# Patient Record
Sex: FEMALE | Race: WHITE | NOT HISPANIC OR LATINO | Employment: OTHER | URBAN - METROPOLITAN AREA
[De-identification: names, ages, dates, MRNs, and addresses within clinical notes are randomized per-mention and may not be internally consistent; named-entity substitution may affect disease eponyms.]

---

## 2017-04-05 ENCOUNTER — OFFICE VISIT (OUTPATIENT)
Dept: ORTHOPEDIC SURGERY | Facility: CLINIC | Age: 67
End: 2017-04-05

## 2017-04-05 VITALS — TEMPERATURE: 98 F | WEIGHT: 145 LBS | BODY MASS INDEX: 21.98 KG/M2 | HEIGHT: 68 IN

## 2017-04-05 DIAGNOSIS — M70.70 BURSITIS, HIP, UNSPECIFIED LATERALITY: ICD-10-CM

## 2017-04-05 DIAGNOSIS — G89.29 HIP PAIN, CHRONIC, RIGHT: Primary | ICD-10-CM

## 2017-04-05 DIAGNOSIS — M25.551 HIP PAIN, CHRONIC, RIGHT: Primary | ICD-10-CM

## 2017-04-05 PROCEDURE — 73502 X-RAY EXAM HIP UNI 2-3 VIEWS: CPT | Performed by: ORTHOPAEDIC SURGERY

## 2017-04-05 PROCEDURE — 20610 DRAIN/INJ JOINT/BURSA W/O US: CPT | Performed by: ORTHOPAEDIC SURGERY

## 2017-04-05 PROCEDURE — 99213 OFFICE O/P EST LOW 20 MIN: CPT | Performed by: ORTHOPAEDIC SURGERY

## 2017-04-05 RX ORDER — METHYLPREDNISOLONE ACETATE 80 MG/ML
80 INJECTION, SUSPENSION INTRA-ARTICULAR; INTRALESIONAL; INTRAMUSCULAR; SOFT TISSUE
Status: COMPLETED | OUTPATIENT
Start: 2017-04-05 | End: 2017-04-05

## 2017-04-05 RX ORDER — LIDOCAINE HYDROCHLORIDE 10 MG/ML
2 INJECTION, SOLUTION INFILTRATION; PERINEURAL
Status: COMPLETED | OUTPATIENT
Start: 2017-04-05 | End: 2017-04-05

## 2017-04-05 RX ADMIN — METHYLPREDNISOLONE ACETATE 80 MG: 80 INJECTION, SUSPENSION INTRA-ARTICULAR; INTRALESIONAL; INTRAMUSCULAR; SOFT TISSUE at 14:19

## 2017-04-05 RX ADMIN — LIDOCAINE HYDROCHLORIDE 2 ML: 10 INJECTION, SOLUTION INFILTRATION; PERINEURAL at 14:19

## 2017-04-05 NOTE — PROGRESS NOTES
Patient: Agnieszka Nettles  YOB: 1950 67 y.o. female  Medical Record Number: [id number]    Chief Complaint:   Chief Complaint   Patient presents with   • Right Hip - Pain, Follow-up       History of Present Illness:Agnieszka Nettles is a 67 y.o. female who presents for follow-up of  right hip.  His been a couple years since she was last here she had a diagnosis of trochanteric bursitis and had an injection.  She feels that the Raffaele helped temporarily but the pain has returned and she is complaining of pain about the lateral aspect of her hip.  She describes as a constant ache worse when she is laying on her side or getting started from a seated position.    Allergies: No Known Allergies    Medications:   Current Outpatient Prescriptions   Medication Sig Dispense Refill   • cholecalciferol (VITAMIN D3) 1000 UNITS tablet Take 1,000 Units by mouth 2 (Two) Times a Day.     • docusate sodium (COLACE) 100 MG capsule Take 100 mg by mouth 2 (Two) Times a Day.     • gabapentin (NEURONTIN) 300 MG capsule TAKE 1 CAPSULE BY MOUTH THREE TIMES DAILY 272 capsule 3   • Iron-Vitamins (GERITOL COMPLETE PO) Take  by mouth Daily.     • levothyroxine (SYNTHROID, LEVOTHROID) 75 MCG tablet Take 75 mcg by mouth Daily.     • meloxicam (MOBIC) 7.5 MG tablet Take 1 tablet by mouth Daily. 35 tablet 3   • ranitidine (ZANTAC) 150 MG tablet TK 1 T PO  BID  5   • vitamin B-12 (CYANOCOBALAMIN) 1000 MCG tablet Take 1,000 mcg by mouth 1 (One) Time Per Week.       No current facility-administered medications for this visit.          The following portions of the patient's history were reviewed and updated as appropriate: allergies, current medications, past family history, past medical history, past social history, past surgical history and problem list.    Review of Systems:   A 14 point review of systems was performed. All systems negative except pertinent positives/negative listed in HPI above    Physical Exam:   Vitals:    04/05/17  "1301   Temp: 98 °F (36.7 °C)   Weight: 145 lb (65.8 kg)   Height: 68\" (172.7 cm)       General: A and O x 3, ASA, NAD    SCLERA:    Normal    DENTITION:   Normal  Hip:  right    LEG ALIGNMENT:     Neutral   ,    equal leg lengths    GAIT:     Nonantalgic    SKIN:     No abnormality    RANGE OF MOTION:      Full without joint irritability    STRENGTH:     5 / 5    hip flexion and abduction    DISTAL PULSES:    Paplable    DISTAL SENSATION :   Intact    LYMPHATICS:     No   lymphadenopathy    OTHER:          - Negative Stinchfeld test      - Negative log roll      +Tenderness to palpation trochanteric bursa     Radiology:    Xrays 2views (AP bilateral hips and lateral hip) were ordered and reviewed for evaluation of hip pain demonstrating no abnormality  Comparison views: todays xrays were compared to previous xrays and demonstrate no change    Assessment/Plan:   right greater than left hip trochanteric bursitis.  I injected the right hip bursa as below.  I spent about 15 minutes going over hip fascia stretching exercises and I will have her perform those exercises 3 minutes twice a day.  I'll see her back on a when necessary basis.  Large Joint Arthrocentesis  Date/Time: 4/5/2017 2:19 PM  Consent given by: patient  Site marked: site marked  Timeout: Immediately prior to procedure a time out was called to verify the correct patient, procedure, equipment, support staff and site/side marked as required   Supporting Documentation  Indications: pain   Procedure Details  Location: hip - R greater trochanteric bursa  Preparation: Patient was prepped and draped in the usual sterile fashion  Needle size: 18 G  Approach: lateral  Medications administered: 2 mL lidocaine 1 %; 80 mg methylPREDNISolone acetate 80 MG/ML              Ahsan Villalobos MD  4/5/2017  "

## 2017-06-08 ENCOUNTER — HOSPITAL ENCOUNTER (OUTPATIENT)
Dept: OTHER | Facility: HOSPITAL | Age: 67
Discharge: HOME | End: 2017-06-08

## 2017-06-08 ENCOUNTER — APPOINTMENT (OUTPATIENT)
Dept: WOMENS IMAGING | Facility: HOSPITAL | Age: 67
End: 2017-06-08

## 2017-06-08 PROCEDURE — 77063 BREAST TOMOSYNTHESIS BI: CPT | Performed by: RADIOLOGY

## 2017-06-08 PROCEDURE — MDREVIEWSP: Performed by: RADIOLOGY

## 2017-06-08 PROCEDURE — G0202 SCR MAMMO BI INCL CAD: HCPCS | Performed by: RADIOLOGY

## 2018-06-26 ENCOUNTER — OFFICE VISIT CONVERTED (OUTPATIENT)
Dept: SURGERY | Facility: CLINIC | Age: 68
End: 2018-06-26
Attending: NURSE PRACTITIONER

## 2018-07-23 ENCOUNTER — HOSPITAL ENCOUNTER (OUTPATIENT)
Dept: OTHER | Facility: HOSPITAL | Age: 68
Discharge: HOME | End: 2018-07-23

## 2018-07-23 ENCOUNTER — APPOINTMENT (OUTPATIENT)
Dept: WOMENS IMAGING | Facility: HOSPITAL | Age: 68
End: 2018-07-23

## 2018-07-23 PROCEDURE — MDREVIEWSP: Performed by: RADIOLOGY

## 2018-07-23 PROCEDURE — 77067 SCR MAMMO BI INCL CAD: CPT | Performed by: RADIOLOGY

## 2018-07-23 PROCEDURE — 77063 BREAST TOMOSYNTHESIS BI: CPT | Performed by: RADIOLOGY

## 2019-03-28 ENCOUNTER — HOSPITAL ENCOUNTER (OUTPATIENT)
Dept: SLEEP MEDICINE | Facility: HOSPITAL | Age: 69
Discharge: HOME | End: 2019-03-28
Attending: INTERNAL MEDICINE

## 2019-07-29 ENCOUNTER — HOSPITAL ENCOUNTER (OUTPATIENT)
Dept: LAB | Facility: HOSPITAL | Age: 69
Discharge: HOME | End: 2019-07-29
Attending: INTERNAL MEDICINE

## 2019-07-29 LAB
ALBUMIN SERPL-MCNC: 4.4 G/DL (ref 3.5–5)
ALBUMIN/GLOB SERPL: 1.5 {RATIO} (ref 1.4–2.6)
ALP SERPL-CCNC: 66 U/L (ref 43–160)
ALT SERPL-CCNC: 18 U/L (ref 10–40)
ANION GAP SERPL CALC-SCNC: 16 MMOL/L (ref 8–19)
AST SERPL-CCNC: 22 U/L (ref 15–50)
BASOPHILS # BLD AUTO: 0.04 10*3/UL (ref 0–0.2)
BASOPHILS NFR BLD AUTO: 0.9 % (ref 0–3)
BILIRUB SERPL-MCNC: 0.67 MG/DL (ref 0.2–1.3)
BUN SERPL-MCNC: 12 MG/DL (ref 5–25)
BUN/CREAT SERPL: 16 {RATIO} (ref 6–20)
CALCIUM SERPL-MCNC: 9.9 MG/DL (ref 8.7–10.4)
CHLORIDE SERPL-SCNC: 103 MMOL/L (ref 99–111)
CONV ABS IMM GRAN: 0.01 10*3/UL (ref 0–0.2)
CONV CO2: 29 MMOL/L (ref 22–32)
CONV IMMATURE GRAN: 0.2 % (ref 0–1.8)
CONV TOTAL PROTEIN: 7.3 G/DL (ref 6.3–8.2)
CREAT UR-MCNC: 0.76 MG/DL (ref 0.5–0.9)
DEPRECATED RDW RBC AUTO: 44.1 FL (ref 36.4–46.3)
EOSINOPHIL # BLD AUTO: 0.07 10*3/UL (ref 0–0.7)
EOSINOPHIL # BLD AUTO: 1.6 % (ref 0–7)
ERYTHROCYTE [DISTWIDTH] IN BLOOD BY AUTOMATED COUNT: 13 % (ref 11.7–14.4)
GFR SERPLBLD BASED ON 1.73 SQ M-ARVRAT: >60 ML/MIN/{1.73_M2}
GLOBULIN UR ELPH-MCNC: 2.9 G/DL (ref 2–3.5)
GLUCOSE SERPL-MCNC: 92 MG/DL (ref 65–99)
HBA1C MFR BLD: 12.5 G/DL (ref 12–16)
HCT VFR BLD AUTO: 39.3 % (ref 37–47)
LYMPHOCYTES # BLD AUTO: 1.6 10*3/UL (ref 1–5)
MCH RBC QN AUTO: 29.4 PG (ref 27–31)
MCHC RBC AUTO-ENTMCNC: 31.8 G/DL (ref 33–37)
MCV RBC AUTO: 92.5 FL (ref 81–99)
MONOCYTES # BLD AUTO: 0.41 10*3/UL (ref 0.2–1.2)
MONOCYTES NFR BLD AUTO: 9.6 % (ref 3–10)
NEUTROPHILS # BLD AUTO: 2.12 10*3/UL (ref 2–8)
NEUTROPHILS NFR BLD AUTO: 50.1 % (ref 30–85)
NRBC CBCN: 0 % (ref 0–0.7)
OSMOLALITY SERPL CALC.SUM OF ELEC: 297 MOSM/KG (ref 273–304)
PLATELET # BLD AUTO: 190 10*3/UL (ref 130–400)
PMV BLD AUTO: 11.3 FL (ref 9.4–12.3)
POTASSIUM SERPL-SCNC: 4 MMOL/L (ref 3.5–5.3)
RBC # BLD AUTO: 4.25 10*6/UL (ref 4.2–5.4)
SODIUM SERPL-SCNC: 144 MMOL/L (ref 135–147)
T4 FREE SERPL-MCNC: 1.3 NG/DL (ref 0.9–1.8)
TSH SERPL-ACNC: 2.64 M[IU]/L (ref 0.27–4.2)
VARIANT LYMPHS NFR BLD MANUAL: 37.6 % (ref 20–45)
WBC # BLD AUTO: 4.25 10*3/UL (ref 4.8–10.8)

## 2019-08-19 ENCOUNTER — APPOINTMENT (OUTPATIENT)
Dept: WOMENS IMAGING | Facility: HOSPITAL | Age: 69
End: 2019-08-19

## 2019-08-19 ENCOUNTER — HOSPITAL ENCOUNTER (OUTPATIENT)
Dept: OTHER | Facility: HOSPITAL | Age: 69
Discharge: HOME | End: 2019-08-19

## 2019-08-19 PROCEDURE — 77067 SCR MAMMO BI INCL CAD: CPT | Performed by: RADIOLOGY

## 2019-08-19 PROCEDURE — 77063 BREAST TOMOSYNTHESIS BI: CPT | Performed by: RADIOLOGY

## 2019-08-19 PROCEDURE — MDREVIEWSP: Performed by: RADIOLOGY

## 2020-07-27 ENCOUNTER — HOSPITAL ENCOUNTER (OUTPATIENT)
Dept: LAB | Facility: HOSPITAL | Age: 70
Discharge: HOME | End: 2020-07-27
Attending: INTERNAL MEDICINE

## 2020-07-27 LAB
ALBUMIN SERPL-MCNC: 4.1 G/DL (ref 3.5–5)
ALBUMIN/GLOB SERPL: 1.6 {RATIO} (ref 1.4–2.6)
ALP SERPL-CCNC: 41 U/L (ref 43–160)
ALT SERPL-CCNC: 16 U/L (ref 10–40)
ANION GAP SERPL CALC-SCNC: 14 MMOL/L (ref 8–19)
AST SERPL-CCNC: 20 U/L (ref 15–50)
BASOPHILS # BLD AUTO: 0.04 10*3/UL (ref 0–0.2)
BASOPHILS NFR BLD AUTO: 1 % (ref 0–3)
BILIRUB SERPL-MCNC: 0.58 MG/DL (ref 0.2–1.3)
BUN SERPL-MCNC: 12 MG/DL (ref 5–25)
BUN/CREAT SERPL: 15 {RATIO} (ref 6–20)
CALCIUM SERPL-MCNC: 9.7 MG/DL (ref 8.7–10.4)
CHLORIDE SERPL-SCNC: 101 MMOL/L (ref 99–111)
CHOLEST SERPL-MCNC: 178 MG/DL (ref 107–200)
CHOLEST/HDLC SERPL: 2.1 {RATIO} (ref 3–6)
CONV ABS IMM GRAN: 0.01 10*3/UL (ref 0–0.2)
CONV CO2: 28 MMOL/L (ref 22–32)
CONV IMMATURE GRAN: 0.2 % (ref 0–1.8)
CONV TOTAL PROTEIN: 6.7 G/DL (ref 6.3–8.2)
CREAT UR-MCNC: 0.78 MG/DL (ref 0.5–0.9)
DEPRECATED RDW RBC AUTO: 44.2 FL (ref 36.4–46.3)
EOSINOPHIL # BLD AUTO: 0.08 10*3/UL (ref 0–0.7)
EOSINOPHIL # BLD AUTO: 1.9 % (ref 0–7)
ERYTHROCYTE [DISTWIDTH] IN BLOOD BY AUTOMATED COUNT: 13 % (ref 11.7–14.4)
GFR SERPLBLD BASED ON 1.73 SQ M-ARVRAT: >60 ML/MIN/{1.73_M2}
GLOBULIN UR ELPH-MCNC: 2.6 G/DL (ref 2–3.5)
GLUCOSE SERPL-MCNC: 85 MG/DL (ref 65–99)
HCT VFR BLD AUTO: 38.9 % (ref 37–47)
HDLC SERPL-MCNC: 85 MG/DL (ref 40–60)
HGB BLD-MCNC: 12.5 G/DL (ref 12–16)
LDLC SERPL CALC-MCNC: 80 MG/DL (ref 70–100)
LYMPHOCYTES # BLD AUTO: 1.57 10*3/UL (ref 1–5)
LYMPHOCYTES NFR BLD AUTO: 37.9 % (ref 20–45)
MCH RBC QN AUTO: 29.8 PG (ref 27–31)
MCHC RBC AUTO-ENTMCNC: 32.1 G/DL (ref 33–37)
MCV RBC AUTO: 92.8 FL (ref 81–99)
MONOCYTES # BLD AUTO: 0.39 10*3/UL (ref 0.2–1.2)
MONOCYTES NFR BLD AUTO: 9.4 % (ref 3–10)
NEUTROPHILS # BLD AUTO: 2.05 10*3/UL (ref 2–8)
NEUTROPHILS NFR BLD AUTO: 49.6 % (ref 30–85)
NRBC CBCN: 0 % (ref 0–0.7)
OSMOLALITY SERPL CALC.SUM OF ELEC: 287 MOSM/KG (ref 273–304)
PLATELET # BLD AUTO: 173 10*3/UL (ref 130–400)
PMV BLD AUTO: 11 FL (ref 9.4–12.3)
POTASSIUM SERPL-SCNC: 4 MMOL/L (ref 3.5–5.3)
RBC # BLD AUTO: 4.19 10*6/UL (ref 4.2–5.4)
SODIUM SERPL-SCNC: 139 MMOL/L (ref 135–147)
T4 FREE SERPL-MCNC: 1.3 NG/DL (ref 0.9–1.8)
TRIGL SERPL-MCNC: 64 MG/DL (ref 40–150)
TSH SERPL-ACNC: 3.33 M[IU]/L (ref 0.27–4.2)
VLDLC SERPL-MCNC: 13 MG/DL (ref 5–37)
WBC # BLD AUTO: 4.14 10*3/UL (ref 4.8–10.8)

## 2020-08-21 ENCOUNTER — APPOINTMENT (OUTPATIENT)
Dept: WOMENS IMAGING | Facility: HOSPITAL | Age: 70
End: 2020-08-21

## 2020-08-21 ENCOUNTER — HOSPITAL ENCOUNTER (OUTPATIENT)
Dept: OTHER | Facility: HOSPITAL | Age: 70
Discharge: HOME | End: 2020-08-21

## 2020-08-21 PROCEDURE — 77063 BREAST TOMOSYNTHESIS BI: CPT | Performed by: RADIOLOGY

## 2020-08-21 PROCEDURE — 77067 SCR MAMMO BI INCL CAD: CPT | Performed by: RADIOLOGY

## 2020-08-27 ENCOUNTER — HOSPITAL ENCOUNTER (OUTPATIENT)
Dept: SLEEP MEDICINE | Facility: HOSPITAL | Age: 70
Discharge: HOME | End: 2020-08-27
Attending: INTERNAL MEDICINE

## 2020-09-09 ENCOUNTER — HOSPITAL ENCOUNTER (OUTPATIENT)
Dept: GENERAL RADIOLOGY | Facility: HOSPITAL | Age: 70
Discharge: HOME | End: 2020-09-09
Attending: INTERNAL MEDICINE

## 2020-09-09 LAB
CREAT BLD-MCNC: 0.7 MG/DL (ref 0.6–1.4)
GFR SERPLBLD BASED ON 1.73 SQ M-ARVRAT: >60 ML/MIN/{1.73_M2}

## 2020-09-25 ENCOUNTER — APPOINTMENT (OUTPATIENT)
Dept: WOMENS IMAGING | Facility: HOSPITAL | Age: 70
End: 2020-09-25

## 2020-09-25 ENCOUNTER — HOSPITAL ENCOUNTER (OUTPATIENT)
Dept: OTHER | Facility: HOSPITAL | Age: 70
Discharge: HOME | End: 2020-09-25

## 2020-09-25 PROCEDURE — 77065 DX MAMMO INCL CAD UNI: CPT | Performed by: RADIOLOGY

## 2020-09-25 PROCEDURE — G0279 TOMOSYNTHESIS, MAMMO: HCPCS | Performed by: RADIOLOGY

## 2020-09-25 PROCEDURE — 76641 ULTRASOUND BREAST COMPLETE: CPT | Performed by: RADIOLOGY

## 2020-12-15 ENCOUNTER — HOSPITAL ENCOUNTER (OUTPATIENT)
Dept: GENERAL RADIOLOGY | Facility: HOSPITAL | Age: 70
Discharge: HOME | End: 2020-12-15
Attending: INTERNAL MEDICINE

## 2021-01-14 ENCOUNTER — OFFICE VISIT (OUTPATIENT)
Dept: OTHER | Facility: HOSPITAL | Age: 71
End: 2021-01-14

## 2021-01-14 VITALS
DIASTOLIC BLOOD PRESSURE: 62 MMHG | WEIGHT: 158 LBS | RESPIRATION RATE: 16 BRPM | HEART RATE: 66 BPM | BODY MASS INDEX: 24.02 KG/M2 | OXYGEN SATURATION: 98 % | SYSTOLIC BLOOD PRESSURE: 100 MMHG

## 2021-01-14 DIAGNOSIS — R91.1 LUNG NODULE: Primary | ICD-10-CM

## 2021-01-14 PROCEDURE — 99204 OFFICE O/P NEW MOD 45 MIN: CPT | Performed by: THORACIC SURGERY (CARDIOTHORACIC VASCULAR SURGERY)

## 2021-01-14 PROCEDURE — G0463 HOSPITAL OUTPT CLINIC VISIT: HCPCS

## 2021-01-14 RX ORDER — POLYETHYLENE GLYCOL 3350 17 G/17G
POWDER, FOR SOLUTION ORAL EVERY 24 HOURS
COMMUNITY

## 2021-01-14 NOTE — PROGRESS NOTES
Subjective   Patient ID: Agnieszka Nettles is a 70 y.o. female is being seen for consultation today at the request of Dr Claus Howe    History of Present Illness  Dear Colleague,  Agnieszka Nettles was seen in the lung care center at [facility] today 2021 as part of our multidisciplinary thoracic oncology clinic.  I have reviewed her history her x-rays and have examined her.  Thank you for asking us to participate in the care of Ms. Nettles.    Patient is a very pleasant 70-year-old  female.  She stopped smoking in .  Prior to that she smoked for approximately 18 years at 1/2 pack cigarettes per day.  She has had significant secondhand smoke exposure her entire life.  Her mother  last year from aortic dissection.  It was recommended that she have evaluation of her aorta.  CT scan was obtained and this showed some nodules in the apex of the right upper lobe.  Follow-up CT in December showed that the nodules were still present and had not changed.  She is here for further evaluation of these nodules.    She reports no cough or hemoptysis.  She has no hoarseness or change in her voice.  She has no pleuritic pain.  She has no unexplained weight loss.  She is active without significant shortness of breath or wheezing.  She does have sleep apnea and uses a CPAP machine.  She has no history of cancer.    The following portions of the patient's history were reviewed and updated as appropriate: allergies, current medications, past family history, past medical history, past social history, past surgical history and problem list.  Review of Systems   Constitution: Negative.   HENT: Negative.    Eyes: Positive for redness.   Cardiovascular: Negative.    Respiratory: Negative.    Endocrine: Negative.    Hematologic/Lymphatic: Negative.    Skin: Positive for dry skin.   Musculoskeletal: Positive for arthritis, joint pain and joint swelling.   Gastrointestinal: Positive for constipation.    Genitourinary: Negative.    Neurological: Positive for excessive daytime sleepiness.   Psychiatric/Behavioral: Negative.    All other systems reviewed and are negative.    Patient Active Problem List   Diagnosis   • Osteoporosis   • Cervical disc disorder     Past Medical History:   Diagnosis Date   • Cervical disc disorder    • CTS (carpal tunnel syndrome)    • GERD (gastroesophageal reflux disease)    • Lung nodule    • Osteoporosis    • Sleep apnea    • Thyroid disease      Past Surgical History:   Procedure Laterality Date   • BREAST AUGMENTATION     • CARPAL TUNNEL RELEASE     • CHOLECYSTECTOMY     • HEMORROIDECTOMY     • HYSTERECTOMY       History reviewed. No pertinent family history.  Social History     Socioeconomic History   • Marital status: Unknown     Spouse name: Not on file   • Number of children: Not on file   • Years of education: Not on file   • Highest education level: Not on file   Occupational History   • Occupation: retired   Tobacco Use   • Smoking status: Former Smoker     Types: Cigarettes     Quit date: 10/3/1981     Years since quittin.3   • Smokeless tobacco: Never Used   Substance and Sexual Activity   • Alcohol use: No   • Drug use: No   • Sexual activity: Defer       Current Outpatient Medications:   •  cholecalciferol (VITAMIN D3) 1000 UNITS tablet, Take 1,000 Units by mouth 2 (Two) Times a Day., Disp: , Rfl:   •  levothyroxine (SYNTHROID, LEVOTHROID) 75 MCG tablet, Take 75 mcg by mouth Daily., Disp: , Rfl:   •  polyethylene glycol (MiraLax) 17 g packet, Daily., Disp: , Rfl:   •  vitamin B-12 (CYANOCOBALAMIN) 1000 MCG tablet, Take 1,000 mcg by mouth 1 (One) Time Per Week., Disp: , Rfl:   •  docusate sodium (COLACE) 100 MG capsule, Take 100 mg by mouth 2 (Two) Times a Day., Disp: , Rfl:   •  gabapentin (NEURONTIN) 300 MG capsule, TAKE 1 CAPSULE BY MOUTH THREE TIMES DAILY, Disp: 272 capsule, Rfl: 3  •  Iron-Vitamins (GERITOL COMPLETE PO), Take  by mouth Daily.,  Disp: , Rfl:   •  meloxicam (MOBIC) 7.5 MG tablet, Take 1 tablet by mouth Daily., Disp: 35 tablet, Rfl: 3  •  ranitidine (ZANTAC) 150 MG tablet, TK 1 T PO  BID, Disp: , Rfl: 5  No Known Allergies     Objective   Vitals:    01/14/21 0938   BP: 100/62   Pulse: 66   Resp: 16   SpO2: 98%     Physical Exam  Constitutional:       Appearance: Normal appearance. She is well-developed.   HENT:      Head: Normocephalic.   Eyes:      General: Lids are normal.      Conjunctiva/sclera: Conjunctivae normal.      Pupils: Pupils are equal, round, and reactive to light.   Neck:      Musculoskeletal: Normal range of motion and neck supple.      Thyroid: No thyroid mass or thyromegaly.      Vascular: No carotid bruit, hepatojugular reflux or JVD.      Trachea: Trachea normal.   Cardiovascular:      Rate and Rhythm: Normal rate and regular rhythm.  No extrasystoles are present.     Chest Wall: PMI is not displaced.      Pulses: Normal pulses.      Heart sounds: Normal heart sounds, S1 normal and S2 normal.   Pulmonary:      Effort: Pulmonary effort is normal.      Breath sounds: Normal breath sounds.   Abdominal:      General: Bowel sounds are normal.      Palpations: Abdomen is soft. There is no mass.      Tenderness: There is no abdominal tenderness.      Hernia: No hernia is present.   Musculoskeletal: Normal range of motion.   Skin:     General: Skin is warm and dry.   Neurological:      Mental Status: She is alert and oriented to person, place, and time.      Cranial Nerves: No cranial nerve deficit.      Sensory: No sensory deficit.      Deep Tendon Reflexes: Reflexes are normal and symmetric.   Psychiatric:         Speech: Speech normal.         Behavior: Behavior normal.         Thought Content: Thought content normal.         Judgment: Judgment normal.       Independent Review of Radiographic Studies:    CT scan of the chest performed at [facility] was independently reviewed.  There is a 10 mm noncalcified nodule in  the anterior apical aspect of the right upper lobe of the lung.  There are 2 other noncalcified nodules in the right apex posterior that measure 4 and 7 mm.  There are changes of COPD and emphysema.  There is no suspicious hilar or mediastinal adenopathy.  There is no pleural effusion.  Thoracic aorta is normal in caliber.    Assessment/Plan   Assessment:  These nodules are indeterminate.  They certainly could represent malignancy but could just as well represent granulomatous disease or scar tissue.  I believe the next step would be a CT PET scan.  If the nodules are PET positive then I think we could proceed on with a biopsy.  If the nodules are PET negative then I would just follow her with serial CT scans.  I have explained this plan to the patient in detail.  I have answered all of her questions.  She is requested that we proceed.    Plan:  CT PET scan.  Return to clinic for further evaluation after the PET scan.  I will keep you informed of her progress.  Thank you for allowing us to participate in the care of Ms. Nettles.    Diagnoses and all orders for this visit:    Lung nodule  -     NM Pet Skull Base To Mid Thigh; Future    Other orders  -     polyethylene glycol (MiraLax) 17 g packet; Daily.

## 2021-01-15 NOTE — PATIENT INSTRUCTIONS
Pt seen by Dr. Charles and will have a PET in [facility] on 01/19 and will return to clinic for results. Pt instructed to call nurse navigator with any questions or concerns. Pt given contact cards for Dr. Charles and nurse navigator.

## 2021-01-19 ENCOUNTER — HOSPITAL ENCOUNTER (OUTPATIENT)
Dept: PET IMAGING | Facility: HOSPITAL | Age: 71
Discharge: HOME | End: 2021-01-19

## 2021-02-02 ENCOUNTER — HOSPITAL ENCOUNTER (OUTPATIENT)
Dept: VACCINE CLINIC | Facility: HOSPITAL | Age: 71
Discharge: HOME | End: 2021-02-02
Attending: INTERNAL MEDICINE

## 2021-02-04 ENCOUNTER — OFFICE VISIT (OUTPATIENT)
Dept: OTHER | Facility: HOSPITAL | Age: 71
End: 2021-02-04

## 2021-02-04 VITALS
RESPIRATION RATE: 16 BRPM | HEART RATE: 76 BPM | SYSTOLIC BLOOD PRESSURE: 106 MMHG | DIASTOLIC BLOOD PRESSURE: 63 MMHG | OXYGEN SATURATION: 100 %

## 2021-02-04 DIAGNOSIS — R91.1 LUNG NODULE: Primary | ICD-10-CM

## 2021-02-04 PROCEDURE — G0463 HOSPITAL OUTPT CLINIC VISIT: HCPCS

## 2021-02-04 PROCEDURE — 99213 OFFICE O/P EST LOW 20 MIN: CPT | Performed by: THORACIC SURGERY (CARDIOTHORACIC VASCULAR SURGERY)

## 2021-02-04 NOTE — PROGRESS NOTES
Subjective   Patient ID: Agnieszka Nettles is a 70 y.o. female is here today for follow-up.    History of Present Illness  Dear Colleague,  Agnieszka Nettles was seen in care center at [facility] today February 4, 2021 as part of her multidisciplinary thoracic oncology clinic.  Since her initial evaluation she is undergone a CT PET scan to further characterize the right upper lobe lung nodules.  Patient denies shortness of breath or wheezing.  She has no cough or hemoptysis.  She has no hoarseness or change in her voice.  She has no unexplained weight loss.    The following portions of the patient's history were reviewed and updated as appropriate: allergies, current medications, past family history, past medical history, past social history, past surgical history and problem list.  Review of Systems   Constitution: Negative.   HENT: Negative.    Eyes: Positive for redness.   Cardiovascular: Negative.    Respiratory: Negative.    Endocrine: Negative.    Hematologic/Lymphatic: Negative.    Skin: Positive for dry skin.   Musculoskeletal: Positive for arthritis, joint pain and joint swelling.   Gastrointestinal: Positive for constipation.   Genitourinary: Negative.    Neurological: Positive for excessive daytime sleepiness.   Psychiatric/Behavioral: Negative.    All other systems reviewed and are negative.    Patient Active Problem List   Diagnosis   • Osteoporosis   • Cervical disc disorder     Past Medical History:   Diagnosis Date   • Cervical disc disorder    • CTS (carpal tunnel syndrome)    • GERD (gastroesophageal reflux disease)    • Lung nodule    • Osteoporosis    • Sleep apnea    • Thyroid disease      Past Surgical History:   Procedure Laterality Date   • BREAST AUGMENTATION  2001   • CARPAL TUNNEL RELEASE     • CHOLECYSTECTOMY  2005   • HEMORROIDECTOMY  1979   • HYSTERECTOMY  2010     History reviewed. No pertinent family history.  Social History     Socioeconomic History   • Marital status:  Unknown     Spouse name: Not on file   • Number of children: Not on file   • Years of education: Not on file   • Highest education level: Not on file   Occupational History   • Occupation: retired   Tobacco Use   • Smoking status: Former Smoker     Types: Cigarettes     Quit date: 10/3/1981     Years since quittin.3   • Smokeless tobacco: Never Used   Substance and Sexual Activity   • Alcohol use: No   • Drug use: No   • Sexual activity: Defer       Current Outpatient Medications:   •  cholecalciferol (VITAMIN D3) 1000 UNITS tablet, Take 1,000 Units by mouth 2 (Two) Times a Day., Disp: , Rfl:   •  docusate sodium (COLACE) 100 MG capsule, Take 100 mg by mouth 2 (Two) Times a Day., Disp: , Rfl:   •  gabapentin (NEURONTIN) 300 MG capsule, TAKE 1 CAPSULE BY MOUTH THREE TIMES DAILY, Disp: 272 capsule, Rfl: 3  •  Iron-Vitamins (GERITOL COMPLETE PO), Take  by mouth Daily., Disp: , Rfl:   •  levothyroxine (SYNTHROID, LEVOTHROID) 75 MCG tablet, Take 75 mcg by mouth Daily., Disp: , Rfl:   •  meloxicam (MOBIC) 7.5 MG tablet, Take 1 tablet by mouth Daily., Disp: 35 tablet, Rfl: 3  •  polyethylene glycol (MiraLax) 17 g packet, Daily., Disp: , Rfl:   •  ranitidine (ZANTAC) 150 MG tablet, TK 1 T PO  BID, Disp: , Rfl: 5  •  vitamin B-12 (CYANOCOBALAMIN) 1000 MCG tablet, Take 1,000 mcg by mouth 1 (One) Time Per Week., Disp: , Rfl:   No Known Allergies     Objective   Vitals:    21 0906   BP: 106/63   Pulse: 76   Resp: 16   SpO2: 100%     Physical Exam  Constitutional:       Appearance: Normal appearance. She is well-developed.   HENT:      Head: Normocephalic.   Eyes:      General: Lids are normal.      Conjunctiva/sclera: Conjunctivae normal.      Pupils: Pupils are equal, round, and reactive to light.   Neck:      Musculoskeletal: Normal range of motion and neck supple.      Thyroid: No thyroid mass or thyromegaly.      Vascular: No carotid bruit, hepatojugular reflux or JVD.      Trachea: Trachea normal.    Cardiovascular:      Rate and Rhythm: Normal rate and regular rhythm.  No extrasystoles are present.     Chest Wall: PMI is not displaced.      Pulses: Normal pulses.      Heart sounds: Normal heart sounds, S1 normal and S2 normal.   Pulmonary:      Effort: Pulmonary effort is normal.      Breath sounds: Normal breath sounds.   Abdominal:      General: Bowel sounds are normal.      Palpations: Abdomen is soft. There is no mass.      Tenderness: There is no abdominal tenderness.      Hernia: No hernia is present.   Musculoskeletal: Normal range of motion.   Skin:     General: Skin is warm and dry.   Neurological:      Mental Status: She is alert and oriented to person, place, and time.      Cranial Nerves: No cranial nerve deficit.      Sensory: No sensory deficit.      Deep Tendon Reflexes: Reflexes are normal and symmetric.   Psychiatric:         Speech: Speech normal.         Behavior: Behavior normal.         Thought Content: Thought content normal.         Judgment: Judgment normal.       Independent Review of Radiographic Studies:    [facility] was independently reviewed.  There is some mild metabolic activity within the oral cavity and around the palate teen tonsil which is thought to be physiologic.  Nonspecific activity is seen in the right parotid area.  Manger of the neck chest abdomen and pelvis is negative.    Assessment/Plan   Assessment:  No uptake within the pulmonary nodules in the right lung.  Have recommended that we follow these with serial CT scans.  Would like to do a another CT of the chest in 4 months.  If there is no change in these nodules then we can start an every 6-month surveillance.  Have discussed this with the patient in detail.  She understands this plan and agrees.    Plan:  Return to my office in 4 months with a CT of the chest without contrast.  I will keep you informed of her progress.  Thank you for allowing me to  participate in the care of MsGio Tho.    Diagnoses and all orders for this visit:    Lung nodule  -     CT Chest Without Contrast; Future

## 2021-02-05 NOTE — PATIENT INSTRUCTIONS
Pt seen by Dr. Charles and will be scheduled for a CT chest in 4 mos in [facility] and will follow up with Dr. Charles in the thoracic office. Pt instructed to call nurse navigator with any questions or concerns. Pt given contact cards for Dr. Charles and nurse navigator.

## 2021-02-26 ENCOUNTER — HOSPITAL ENCOUNTER (OUTPATIENT)
Dept: VACCINE CLINIC | Facility: HOSPITAL | Age: 71
Discharge: HOME | End: 2021-02-26
Attending: INTERNAL MEDICINE

## 2021-05-16 VITALS — RESPIRATION RATE: 18 BRPM | BODY MASS INDEX: 23.23 KG/M2 | WEIGHT: 148 LBS | HEIGHT: 67 IN

## 2021-06-01 ENCOUNTER — HOSPITAL ENCOUNTER (OUTPATIENT)
Dept: GENERAL RADIOLOGY | Facility: HOSPITAL | Age: 71
Discharge: HOME | End: 2021-06-01

## 2021-06-01 ENCOUNTER — APPOINTMENT (OUTPATIENT)
Dept: CT IMAGING | Facility: HOSPITAL | Age: 71
End: 2021-06-01

## 2021-06-07 ENCOUNTER — OFFICE VISIT (OUTPATIENT)
Dept: SURGERY | Facility: CLINIC | Age: 71
End: 2021-06-07

## 2021-06-07 VITALS
HEART RATE: 73 BPM | OXYGEN SATURATION: 98 % | RESPIRATION RATE: 16 BRPM | SYSTOLIC BLOOD PRESSURE: 121 MMHG | DIASTOLIC BLOOD PRESSURE: 63 MMHG

## 2021-06-07 DIAGNOSIS — R91.8 PULMONARY NODULES/LESIONS, MULTIPLE: Primary | ICD-10-CM

## 2021-06-07 PROCEDURE — 99213 OFFICE O/P EST LOW 20 MIN: CPT | Performed by: NURSE PRACTITIONER

## 2021-06-07 RX ORDER — FLUTICASONE PROPIONATE 50 MCG
2 SPRAY, SUSPENSION (ML) NASAL DAILY
COMMUNITY
Start: 2021-05-02 | End: 2022-04-13

## 2021-06-07 RX ORDER — LEVOTHYROXINE SODIUM 88 UG/1
88 TABLET ORAL EVERY MORNING
COMMUNITY
Start: 2021-03-18 | End: 2021-09-15

## 2021-06-07 NOTE — PROGRESS NOTES
Chief Complaint  Pulmonary nodules, follow-up with CT chest    Subjective          Agnieszka Nettles presents to [facility] for continued follow-up concerning pulmonary nodules.  The patient was initially seen by Dr. Charles in the multidisciplinary thoracic oncology clinic.  She has undergone PET scan for further evaluation and there was no uptake in the pulmonary nodules.  She is continuing to follow with us  for surveillance and presents today with an updated CT of the chest.  The patient denies any complaints today.  She has had no fever, chills, cough, hemoptysis or shortness of air.  She has received both doses of the COVID-19 vaccine.    History of Present Illness    Objective   Vital Signs:   /63 (BP Location: Right arm, Patient Position: Sitting, Cuff Size: Adult)   Pulse 73   Resp 16   SpO2 98%     Physical Exam  Vitals and nursing note reviewed.   Constitutional:       Appearance: Normal appearance.   HENT:      Head: Normocephalic and atraumatic.   Eyes:      General: No scleral icterus.  Cardiovascular:      Pulses: Normal pulses.      Heart sounds: Normal heart sounds.   Pulmonary:      Effort: Pulmonary effort is normal. No respiratory distress.      Breath sounds: Normal breath sounds. No wheezing, rhonchi or rales.   Abdominal:      General: Bowel sounds are normal.      Palpations: Abdomen is soft.   Musculoskeletal:         General: Swelling present.      Cervical back: Neck supple.   Skin:     General: Skin is warm and dry.      Capillary Refill: Capillary refill takes less than 2 seconds.   Neurological:      General: No focal deficit present.      Mental Status: She is alert and oriented to person, place, and time. Mental status is at baseline.   Psychiatric:         Mood and Affect: Mood normal.         Behavior: Behavior normal.         Thought Content: Thought content normal.         Judgment: Judgment normal.        Result Review :   The following  data was reviewed by: GHAZALA Dewitt on 06/07/2021:    Data reviewed: Radiologic studies There are stable non-calcified pulmonary nodules -largest being a 1 cm right upper lobe nodule.  3.5 cm hepatic cyst is stable.          Assessment and Plan    Diagnoses and all orders for this visit:    1. Pulmonary nodules/lesions, multiple (Primary)  -     CT Chest Without Contrast Diagnostic; Future    Ms. Nettles is a pleasant 71-year-old female who we are following with CT surveillance concerning pulmonary nodules.  CT of the chest today is stable with no evidence of new nodules, pleural effusion or acute pulmonary process.  We will continue surveillance with another CT of the chest in 6 months and have her follow-up with us at that time.  Thank you for allowing us to participate in the care of Agnieszka Nettles.  We will continue to keep you informed of her progress.  Patient would prefer CT chest to be performed at [facility].  I spent 23 minutes caring for Agnieszka on this date of service. This time includes time spent by me in the following activities:preparing for the visit, reviewing tests, performing a medically appropriate examination and/or evaluation , counseling and educating the patient/family/caregiver, ordering medications, tests, or procedures, referring and communicating with other health care professionals , independently interpreting results and communicating that information with the patient/family/caregiver and care coordination  Follow Up   Return in about 6 months (around 12/7/2021) for Next scheduled follow up with CT chest.  Patient was given instructions and counseling regarding her condition or for health maintenance advice. Please see specific information pulled into the AVS if appropriate.

## 2021-06-14 ENCOUNTER — LAB (OUTPATIENT)
Dept: LAB | Facility: HOSPITAL | Age: 71
End: 2021-06-14

## 2021-06-14 ENCOUNTER — TRANSCRIBE ORDERS (OUTPATIENT)
Dept: LAB | Facility: HOSPITAL | Age: 71
End: 2021-06-14

## 2021-06-14 DIAGNOSIS — E03.9 HYPOTHYROIDISM, UNSPECIFIED TYPE: ICD-10-CM

## 2021-06-14 DIAGNOSIS — R53.83 FATIGUE, UNSPECIFIED TYPE: ICD-10-CM

## 2021-06-14 DIAGNOSIS — D72.89 OTHER SPECIFIED DISORDERS OF WHITE BLOOD CELLS: ICD-10-CM

## 2021-06-14 DIAGNOSIS — R53.83 FATIGUE, UNSPECIFIED TYPE: Primary | ICD-10-CM

## 2021-06-14 LAB
ALBUMIN SERPL-MCNC: 4.3 G/DL (ref 3.5–5.2)
ALBUMIN/GLOB SERPL: 1.7 G/DL
ALP SERPL-CCNC: 45 U/L (ref 39–117)
ALT SERPL W P-5'-P-CCNC: 21 U/L (ref 1–33)
ANION GAP SERPL CALCULATED.3IONS-SCNC: 6.6 MMOL/L (ref 5–15)
AST SERPL-CCNC: 21 U/L (ref 1–32)
BASOPHILS # BLD AUTO: 0.03 10*3/MM3 (ref 0–0.2)
BASOPHILS NFR BLD AUTO: 0.9 % (ref 0–1.5)
BILIRUB SERPL-MCNC: 0.6 MG/DL (ref 0–1.2)
BUN SERPL-MCNC: 11 MG/DL (ref 8–23)
BUN/CREAT SERPL: 13.9 (ref 7–25)
CALCIUM SPEC-SCNC: 9.4 MG/DL (ref 8.6–10.5)
CHLORIDE SERPL-SCNC: 103 MMOL/L (ref 98–107)
CO2 SERPL-SCNC: 29.4 MMOL/L (ref 22–29)
CREAT SERPL-MCNC: 0.79 MG/DL (ref 0.57–1)
DEPRECATED RDW RBC AUTO: 42 FL (ref 37–54)
EOSINOPHIL # BLD AUTO: 0.08 10*3/MM3 (ref 0–0.4)
EOSINOPHIL NFR BLD AUTO: 2.3 % (ref 0.3–6.2)
ERYTHROCYTE [DISTWIDTH] IN BLOOD BY AUTOMATED COUNT: 12.7 % (ref 12.3–15.4)
GFR SERPL CREATININE-BSD FRML MDRD: 72 ML/MIN/1.73
GLOBULIN UR ELPH-MCNC: 2.6 GM/DL
GLUCOSE SERPL-MCNC: 88 MG/DL (ref 65–99)
HCT VFR BLD AUTO: 37.4 % (ref 34–46.6)
HGB BLD-MCNC: 12.7 G/DL (ref 12–15.9)
IMM GRANULOCYTES # BLD AUTO: 0 10*3/MM3 (ref 0–0.05)
IMM GRANULOCYTES NFR BLD AUTO: 0 % (ref 0–0.5)
LYMPHOCYTES # BLD AUTO: 1.3 10*3/MM3 (ref 0.7–3.1)
LYMPHOCYTES NFR BLD AUTO: 37.2 % (ref 19.6–45.3)
MCH RBC QN AUTO: 30.7 PG (ref 26.6–33)
MCHC RBC AUTO-ENTMCNC: 34 G/DL (ref 31.5–35.7)
MCV RBC AUTO: 90.3 FL (ref 79–97)
MONOCYTES # BLD AUTO: 0.32 10*3/MM3 (ref 0.1–0.9)
MONOCYTES NFR BLD AUTO: 9.2 % (ref 5–12)
NEUTROPHILS NFR BLD AUTO: 1.76 10*3/MM3 (ref 1.7–7)
NEUTROPHILS NFR BLD AUTO: 50.4 % (ref 42.7–76)
NRBC BLD AUTO-RTO: 0 /100 WBC (ref 0–0.2)
PLATELET # BLD AUTO: 186 10*3/MM3 (ref 140–450)
PMV BLD AUTO: 11.2 FL (ref 6–12)
POTASSIUM SERPL-SCNC: 4 MMOL/L (ref 3.5–5.2)
PROT SERPL-MCNC: 6.9 G/DL (ref 6–8.5)
RBC # BLD AUTO: 4.14 10*6/MM3 (ref 3.77–5.28)
SODIUM SERPL-SCNC: 139 MMOL/L (ref 136–145)
T4 FREE SERPL-MCNC: 1.16 NG/DL (ref 0.93–1.7)
TSH SERPL DL<=0.05 MIU/L-ACNC: 3.12 UIU/ML (ref 0.27–4.2)
WBC # BLD AUTO: 3.49 10*3/MM3 (ref 3.4–10.8)

## 2021-06-14 PROCEDURE — 84443 ASSAY THYROID STIM HORMONE: CPT

## 2021-06-14 PROCEDURE — 80053 COMPREHEN METABOLIC PANEL: CPT

## 2021-06-14 PROCEDURE — 36415 COLL VENOUS BLD VENIPUNCTURE: CPT

## 2021-06-14 PROCEDURE — 84439 ASSAY OF FREE THYROXINE: CPT

## 2021-06-14 PROCEDURE — 85025 COMPLETE CBC W/AUTO DIFF WBC: CPT

## 2021-07-22 ENCOUNTER — OFFICE VISIT (OUTPATIENT)
Dept: SLEEP MEDICINE | Facility: HOSPITAL | Age: 71
End: 2021-07-22

## 2021-07-22 VITALS
HEIGHT: 68 IN | WEIGHT: 153 LBS | SYSTOLIC BLOOD PRESSURE: 114 MMHG | DIASTOLIC BLOOD PRESSURE: 60 MMHG | OXYGEN SATURATION: 99 % | HEART RATE: 69 BPM | BODY MASS INDEX: 23.19 KG/M2

## 2021-07-22 DIAGNOSIS — G47.33 OSA (OBSTRUCTIVE SLEEP APNEA): Primary | ICD-10-CM

## 2021-07-22 PROCEDURE — G0463 HOSPITAL OUTPT CLINIC VISIT: HCPCS

## 2021-07-22 NOTE — PROGRESS NOTES
"      [facility]         Dr Rubi Merrill  [unfilled]  Patient Care Team:  Joaquin Howe MD as PCP - General (Internal Medicine)    Chief Complaint:initial sleep study showed AHI 10.7 events per hour    Interval History: Patient here for follow-up on CPAP.  Currently on auto CPAP 8 to 14 cm.  Unfortunately compliance download is not available today patient forgot to bring her chip.  She feels rested and benefiting with the CPAP machine lots of questions regarding developed recall.  Goes to bed 1030 gets up 530 gets about 7 hours of sleep and more rested with the CPAP.  No tobacco no alcohol no caffeine abuse.  Currently has a nasal mask that fits well.  Supplies have been adequate.    REVIEW OF SYSTEMS:   CARDIOVASCULAR: No chest pain, chest pressure or chest discomfort. No palpitations or edema.   RESPIRATORY: No shortness of breath, cough or sputum.   GASTROINTESTINAL: No anorexia, nausea, vomiting or diarrhea. No abdominal pain or blood.   HEMATOLOGIC: No bleeding or bruising.     Ventilator/Non-Invasive Ventilation Settings (From admission, onward)    None      Sarles 8 out of 24 within normal limits      Vital Signs  Heart Rate:  [69] 69  BP: (114)/(60) 114/60  [unfilled]  Flowsheet Rows      First Filed Value   Admission Height  172.7 cm (68\") Documented at 07/22/2021 1300   Admission Weight  69.4 kg (153 lb) Documented at 07/22/2021 1300          Physical Exam:  Patient is examined using the personal protective equipment as per guidelines from infection control for this particular patient as enacted.  Hand hygiene was performed before and after patient interaction.   General Appearance:    Alert, cooperative, in no acute distress.  Following simple commands  Neck midline trachea, no thyromegaly   Lungs:     Clear to auscultation, respirations regular, even and                  unlabored  ENT Mallampati between 3 and 4 normal nasal exam    Heart:    Regular rhythm and normal rate, normal " S1 and S2, no            murmur, no gallop, no rub, no click   Chest Wall:    No abnormalities observed   Abdomen:     Normal bowel sounds, no masses, no organomegaly, soft        nontender, nondistended, no guarding, no rebound                tenderness   Extremities:   Moves all extremities well, no edema, no cyanosis, no             redness  CNS no focal neurological deficits normal sensory exam  Skin no rashes no nodules  Musculoskeletal no cyanosis no clubbing normal range of motion     Results Review:                                          I reviewed the patient's new clinical results.  I personally viewed and interpreted the patient's chest x-ray.        Medication Review:       No current facility-administered medications for this visit.      ASSESSMENT:   1.  Obstructive sleep apnea.    2.  Gastroesophageal reflux disease.    3.  Allergic rhinitis.    4.  Hypothyroidism.     PLAN:  Unfortunately compliance download not available.  Information regarding Hernadez recall was given to the patient lots of questions answered.  Given new prescription for new auto CPAP if can be replaced out of medical necessity and patient should not be charged for it.  Recommend patient continue using CPAP to avoid risk of cardiovascular comorbidities  Treatment of underlying comorbidities  Discussed treatment of allergies with nasal steroid and Zyrtec.  If no improvement consider referral to allergist.  Sleep hygiene measures  Follow-up in 1 year      Rubi Merrill MD  07/22/21  14:42 EDT

## 2021-07-26 PROBLEM — R91.1 SOLITARY PULMONARY NODULE: Status: ACTIVE | Noted: 2021-07-26

## 2021-07-26 PROBLEM — E03.9 HYPOTHYROIDISM: Status: ACTIVE | Noted: 2021-07-26

## 2021-07-26 PROBLEM — E55.9 VITAMIN D DEFICIENCY: Status: ACTIVE | Noted: 2021-07-26

## 2021-07-26 PROBLEM — I71.019 DISSECTION OF THORACIC AORTA: Status: ACTIVE | Noted: 2021-07-26

## 2021-07-26 PROBLEM — D64.9 ANEMIA: Status: ACTIVE | Noted: 2021-07-26

## 2021-07-26 PROBLEM — K21.9 GASTROESOPHAGEAL REFLUX DISEASE: Status: ACTIVE | Noted: 2021-07-26

## 2021-07-26 PROBLEM — M19.90 ARTHRITIS: Status: ACTIVE | Noted: 2021-07-26

## 2021-07-26 PROBLEM — K64.9 HEMORRHOIDS: Status: ACTIVE | Noted: 2021-07-26

## 2021-08-07 PROBLEM — R91.8 PULMONARY NODULES/LESIONS, MULTIPLE: Status: ACTIVE | Noted: 2021-08-07

## 2021-08-07 PROBLEM — R53.83 FATIGUE: Status: ACTIVE | Noted: 2021-08-07

## 2021-08-07 PROBLEM — D72.819 CHRONIC LEUKOPENIA: Status: ACTIVE | Noted: 2021-08-07

## 2021-08-07 PROBLEM — R13.10 DYSPHAGIA: Status: ACTIVE | Noted: 2021-08-07

## 2021-08-07 NOTE — PROGRESS NOTES
"Chief Complaint  Follow-up, f/u thyroid meds and hoarseness  Vein issues       Subjective  --doing well, no falls , no cp , breathing ok except hoarseness          Agnieszka Nettles presents to [facility]      Objective   Vital Signs  Vitals:    08/11/21 1158   BP: 111/76   BP Location: Right arm   Patient Position: Sitting   Pulse: 78   Temp: 98.2 °F (36.8 °C)   SpO2: 97%   Weight: 68.8 kg (151 lb 9.6 oz)   Height: 170.2 cm (67\")      Review of Systems   Constitutional: Negative.    HENT: Negative.    Eyes: Negative.    Respiratory: Negative.    Cardiovascular: Negative.    Gastrointestinal: Negative.    Endocrine: Negative.    Genitourinary: Negative.    Musculoskeletal: Negative.    Allergic/Immunologic: Negative.    Neurological: Negative.    Hematological: Negative.    Psychiatric/Behavioral: Negative.       Physical Exam  Constitutional:       General: She is not in acute distress.     Appearance: Normal appearance.   HENT:      Head: Normocephalic.      Mouth/Throat:      Mouth: Mucous membranes are moist.   Eyes:      Conjunctiva/sclera: Conjunctivae normal.      Pupils: Pupils are equal, round, and reactive to light.   Cardiovascular:      Rate and Rhythm: Normal rate and regular rhythm.      Pulses: Normal pulses.      Heart sounds: Normal heart sounds.   Pulmonary:      Effort: Pulmonary effort is normal.      Breath sounds: Normal breath sounds.   Abdominal:      General: Abdomen is flat. Bowel sounds are normal.      Palpations: Abdomen is soft.   Musculoskeletal:         General: No swelling. Normal range of motion.      Cervical back: Neck supple.   Skin:     General: Skin is warm and dry.      Coloration: Skin is not jaundiced.   Neurological:      General: No focal deficit present.      Mental Status: She is alert and oriented to person, place, and time. Mental status is at baseline.   Psychiatric:         Mood and Affect: Mood normal.         Behavior: Behavior " normal.         Thought Content: Thought content normal.         Judgment: Judgment normal.     Patient has prominent superficial varicosities throughout her both lower legs, which is trace edema bilateral,  Result Review :   No results found for: PROBNP, BNP  CMP    CMP 3/25/21 6/14/21   Glucose  88   BUN  11   Creatinine  0.79   eGFR Non African Am  72   Sodium  139   Potassium  4.0   Chloride  103   Calcium 10.2 9.4   Albumin  4.30   Total Bilirubin  0.6   Alkaline Phosphatase  45   AST (SGOT)  21   ALT (SGPT)  21           CBC w/diff    CBC w/Diff 6/14/21   WBC 3.49   RBC 4.14   Hemoglobin 12.7   Hematocrit 37.4   MCV 90.3   MCH 30.7   MCHC 34.0   RDW 12.7   Platelets 186   Neutrophil Rel % 50.4   Immature Granulocyte Rel % 0.0   Lymphocyte Rel % 37.2   Monocyte Rel % 9.2   Eosinophil Rel % 2.3   Basophil Rel % 0.9               Lab Results   Component Value Date    TSH 3.120 06/14/2021    TSH 3.330 07/27/2020    TSH 2.640 07/29/2019      Lab Results   Component Value Date    FREET4 1.16 06/14/2021    FREET4 1.3 07/27/2020    FREET4 1.3 07/29/2019                          Assessment and Plan      DISCUSSED CT CHEST /ECHO WITH MOTHER HAVING SEVERE AORTIC DISSECITON ? AUG 2020 ---CT scan of the chest shows some noncalcified pulmonary nodules right upper lung, no evidence of aortic dissection, December 15, 2020 ---No changes from the previous CT scan in September 2020, ---- 10 years of smoking plus secondhand smoke for a longer period of time, quit smoking 1981.---Dr. Charles's group in [place], PET SCAN IN DEC 2020 PER DR CHARLES , --CT scan June 2021 showed no change in the pulmonary nodules, stable, next one MD June 2022 per Dr. Charles service      dyspagia, ? globus, sensation, HAS SEEN DR BECKHAM, AND dr silva TIMES 3 in [place], Patient may avoid surgery nasal septal surgery,? THRYOID NODULE WILL DO U/S THYROID , JUNE 2021 ,     FATIGUE, --DID SEE DR BRITTANI LONG AND DR MOULTON----NEG SJORGENS, POSTIVE HASHIMOTOS  ABS, O/W NEG     RAYSHAWN, - , on cpap now june 2017, using Nasonex,---does f/u with sleep med,     CHRONIC LEUKOPENIA,--3.54- 3.92- 4.4, Stable JUNE 2021    HYPOTHRYOIDISM,------- SYNTHROID 0.088 MG QD,     GERD-- --, better since on bipap    OSTEOPORSIS BY BONE DENSITY 2015. ---NOW ON PROLIA PER [facility] IN [place]     DRY MOUTH    HEMORRHOIDS, CHOLEY, CARPAL TUNNEL, BREAST AUGMENTATION 2001  VIT D DEF.   C SCOPE DR BEGUM, 2013, 2018,   MAMMO PER GYN IN [place]      Follow Up   Return in about 9 months (around 5/11/2022).  Patient was given instructions and counseling regarding her condition or for health maintenance advice. Please see specific information pulled into the AVS if appropriate.

## 2021-08-11 ENCOUNTER — OFFICE VISIT (OUTPATIENT)
Dept: INTERNAL MEDICINE | Facility: CLINIC | Age: 71
End: 2021-08-11

## 2021-08-11 VITALS
BODY MASS INDEX: 23.79 KG/M2 | HEART RATE: 78 BPM | WEIGHT: 151.6 LBS | DIASTOLIC BLOOD PRESSURE: 76 MMHG | OXYGEN SATURATION: 97 % | HEIGHT: 67 IN | SYSTOLIC BLOOD PRESSURE: 111 MMHG | TEMPERATURE: 98.2 F

## 2021-08-11 DIAGNOSIS — E06.3 HYPOTHYROIDISM DUE TO HASHIMOTO'S THYROIDITIS: Primary | ICD-10-CM

## 2021-08-11 DIAGNOSIS — E55.9 VITAMIN D DEFICIENCY: ICD-10-CM

## 2021-08-11 DIAGNOSIS — R13.10 DYSPHAGIA, UNSPECIFIED TYPE: ICD-10-CM

## 2021-08-11 DIAGNOSIS — R53.83 FATIGUE, UNSPECIFIED TYPE: ICD-10-CM

## 2021-08-11 DIAGNOSIS — K21.9 GASTROESOPHAGEAL REFLUX DISEASE WITHOUT ESOPHAGITIS: ICD-10-CM

## 2021-08-11 DIAGNOSIS — R91.8 PULMONARY NODULES/LESIONS, MULTIPLE: ICD-10-CM

## 2021-08-11 DIAGNOSIS — D72.819 CHRONIC LEUKOPENIA: ICD-10-CM

## 2021-08-11 DIAGNOSIS — E03.8 HYPOTHYROIDISM DUE TO HASHIMOTO'S THYROIDITIS: Primary | ICD-10-CM

## 2021-08-11 DIAGNOSIS — M81.0 AGE-RELATED OSTEOPOROSIS WITHOUT CURRENT PATHOLOGICAL FRACTURE: ICD-10-CM

## 2021-08-11 PROCEDURE — 99213 OFFICE O/P EST LOW 20 MIN: CPT | Performed by: INTERNAL MEDICINE

## 2021-08-26 ENCOUNTER — TRANSCRIBE ORDERS (OUTPATIENT)
Dept: ADMINISTRATIVE | Facility: HOSPITAL | Age: 71
End: 2021-08-26

## 2021-08-26 DIAGNOSIS — Z78.0 POSTMENOPAUSAL: ICD-10-CM

## 2021-08-26 DIAGNOSIS — M81.0 OSTEOPOROSIS, UNSPECIFIED OSTEOPOROSIS TYPE, UNSPECIFIED PATHOLOGICAL FRACTURE PRESENCE: Primary | ICD-10-CM

## 2021-09-15 RX ORDER — LEVOTHYROXINE SODIUM 88 UG/1
TABLET ORAL
Qty: 90 TABLET | Refills: 1 | Status: SHIPPED | OUTPATIENT
Start: 2021-09-15 | End: 2021-12-17

## 2021-09-27 ENCOUNTER — APPOINTMENT (OUTPATIENT)
Dept: WOMENS IMAGING | Facility: HOSPITAL | Age: 71
End: 2021-09-27

## 2021-09-27 ENCOUNTER — HOSPITAL ENCOUNTER (OUTPATIENT)
Dept: OTHER | Facility: HOSPITAL | Age: 71
Discharge: HOME | End: 2021-09-27

## 2021-09-27 PROCEDURE — 77067 SCR MAMMO BI INCL CAD: CPT | Performed by: RADIOLOGY

## 2021-09-27 PROCEDURE — 77063 BREAST TOMOSYNTHESIS BI: CPT | Performed by: RADIOLOGY

## 2021-11-16 ENCOUNTER — APPOINTMENT (OUTPATIENT)
Dept: BONE DENSITY | Facility: HOSPITAL | Age: 71
End: 2021-11-16

## 2021-12-07 ENCOUNTER — HOSPITAL ENCOUNTER (OUTPATIENT)
Dept: CT IMAGING | Facility: HOSPITAL | Age: 71
Discharge: HOME | End: 2021-12-07
Admitting: NURSE PRACTITIONER

## 2021-12-07 DIAGNOSIS — R91.8 PULMONARY NODULES/LESIONS, MULTIPLE: ICD-10-CM

## 2021-12-07 PROCEDURE — 71250 CT THORAX DX C-: CPT

## 2021-12-14 ENCOUNTER — OFFICE VISIT (OUTPATIENT)
Dept: SURGERY | Facility: CLINIC | Age: 71
End: 2021-12-14

## 2021-12-14 VITALS
HEIGHT: 68 IN | SYSTOLIC BLOOD PRESSURE: 118 MMHG | BODY MASS INDEX: 22.28 KG/M2 | HEART RATE: 66 BPM | WEIGHT: 147 LBS | DIASTOLIC BLOOD PRESSURE: 72 MMHG | OXYGEN SATURATION: 99 %

## 2021-12-14 DIAGNOSIS — R91.8 PULMONARY NODULES/LESIONS, MULTIPLE: Primary | ICD-10-CM

## 2021-12-14 PROCEDURE — 99212 OFFICE O/P EST SF 10 MIN: CPT | Performed by: THORACIC SURGERY (CARDIOTHORACIC VASCULAR SURGERY)

## 2021-12-14 NOTE — PROGRESS NOTES
"Chief Complaint  Pulmonary nodules    Subjective          Agnieszka Nettles presents to [facility] THORAC[facility] SURGERY  History of Present Illness     71-year-old female seen in the office today for follow-up of multiple pulmonary nodules.  We have been following her since January 2021.  She reports no new pulmonary symptoms.  She is active without significant shortness of breath or wheezing.  She has no cough or hemoptysis.  There is no hoarseness or change in her voice.  She has no pleuritic pain.  She has had no unexplained weight loss.    Objective   Vital Signs:   /72 (BP Location: Right arm, Patient Position: Sitting, Cuff Size: Adult)   Pulse 66   Ht 171.5 cm (67.5\")   Wt 66.7 kg (147 lb)   SpO2 99%   BMI 22.68 kg/m²     Physical Exam     Neck: There is no cervical or supraclavicular adenopathy    Pulmonary: Lungs are clear to auscultation with equal breath sounds bilaterally    Cardiac: Regular rate and rhythm.  No murmur or gallop.  No dependent edema.    Result Review :   The following data was reviewed by: Aquilino Charles III, MD on 12/14/2021:    CT scan of the chest performed December 7, 2021 was independently reviewed and compared to previous CT scans.  There has been no change in the fibronodular scarring at the apices of the 2 lungs.  There are no new infiltrates nodules or masses.  No pleural effusions.  There is no suspicious hilar or mediastinal adenopathy.         Assessment and Plan    Diagnoses and all orders for this visit:    1. Pulmonary nodules/lesions, multiple (Primary)  -     CT Chest Without Contrast; Future        CT scan remains stable.  Patient has no pulmonary symptoms.  We'll continue our follow-up with a CT of the chest in 6 months.  I will keep you informed of her progress.      I spent 11 minutes caring for Agnieszka on this date of service. This time includes time spent by me in the following activities:preparing for the visit, reviewing tests, " performing a medically appropriate examination and/or evaluation , counseling and educating the patient/family/caregiver, ordering medications, tests, or procedures, referring and communicating with other health care professionals , documenting information in the medical record, independently interpreting results and communicating that information with the patient/family/caregiver and care coordination  Follow Up   Return in about 6 months (around 6/14/2022) for Recheck.  Patient was given instructions and counseling regarding her condition or for health maintenance advice. Please see specific information pulled into the AVS if appropriate.

## 2021-12-17 RX ORDER — LEVOTHYROXINE SODIUM 88 UG/1
TABLET ORAL
Qty: 90 TABLET | Refills: 3 | Status: SHIPPED | OUTPATIENT
Start: 2021-12-17 | End: 2022-12-27 | Stop reason: SDUPTHER

## 2022-01-26 ENCOUNTER — APPOINTMENT (OUTPATIENT)
Dept: BONE DENSITY | Facility: HOSPITAL | Age: 72
End: 2022-01-26

## 2022-02-15 ENCOUNTER — APPOINTMENT (OUTPATIENT)
Dept: BONE DENSITY | Facility: HOSPITAL | Age: 72
End: 2022-02-15

## 2022-04-08 ENCOUNTER — HOSPITAL ENCOUNTER (OUTPATIENT)
Dept: MAMMOGRAPHY | Facility: HOSPITAL | Age: 72
Discharge: HOME | End: 2022-04-08
Admitting: OBSTETRICS & GYNECOLOGY

## 2022-04-08 DIAGNOSIS — Z78.0 POSTMENOPAUSAL: ICD-10-CM

## 2022-04-08 DIAGNOSIS — M81.0 OSTEOPOROSIS, UNSPECIFIED OSTEOPOROSIS TYPE, UNSPECIFIED PATHOLOGICAL FRACTURE PRESENCE: ICD-10-CM

## 2022-04-08 PROCEDURE — 77080 DXA BONE DENSITY AXIAL: CPT

## 2022-04-13 RX ORDER — FLUTICASONE PROPIONATE 50 MCG
SPRAY, SUSPENSION (ML) NASAL
Qty: 16 G | Refills: 3 | Status: SHIPPED | OUTPATIENT
Start: 2022-04-13 | End: 2023-03-09 | Stop reason: SDUPTHER

## 2022-05-06 ENCOUNTER — LAB (OUTPATIENT)
Dept: LAB | Facility: HOSPITAL | Age: 72
End: 2022-05-06

## 2022-05-06 DIAGNOSIS — E06.3 HYPOTHYROIDISM DUE TO HASHIMOTO'S THYROIDITIS: ICD-10-CM

## 2022-05-06 DIAGNOSIS — M81.0 AGE-RELATED OSTEOPOROSIS WITHOUT CURRENT PATHOLOGICAL FRACTURE: ICD-10-CM

## 2022-05-06 DIAGNOSIS — E55.9 VITAMIN D DEFICIENCY: ICD-10-CM

## 2022-05-06 DIAGNOSIS — R91.8 PULMONARY NODULES/LESIONS, MULTIPLE: ICD-10-CM

## 2022-05-06 DIAGNOSIS — R13.10 DYSPHAGIA, UNSPECIFIED TYPE: ICD-10-CM

## 2022-05-06 DIAGNOSIS — E03.8 HYPOTHYROIDISM DUE TO HASHIMOTO'S THYROIDITIS: ICD-10-CM

## 2022-05-06 DIAGNOSIS — K21.9 GASTROESOPHAGEAL REFLUX DISEASE WITHOUT ESOPHAGITIS: ICD-10-CM

## 2022-05-06 DIAGNOSIS — D72.819 CHRONIC LEUKOPENIA: ICD-10-CM

## 2022-05-06 DIAGNOSIS — R53.83 FATIGUE, UNSPECIFIED TYPE: ICD-10-CM

## 2022-05-06 LAB
25(OH)D3 SERPL-MCNC: 50.5 NG/ML (ref 30–100)
ALBUMIN SERPL-MCNC: 4.5 G/DL (ref 3.5–5.2)
ALBUMIN/GLOB SERPL: 1.5 G/DL
ALP SERPL-CCNC: 47 U/L (ref 39–117)
ALT SERPL W P-5'-P-CCNC: 26 U/L (ref 1–33)
ANION GAP SERPL CALCULATED.3IONS-SCNC: 9.6 MMOL/L (ref 5–15)
AST SERPL-CCNC: 24 U/L (ref 1–32)
BASOPHILS # BLD AUTO: 0.04 10*3/MM3 (ref 0–0.2)
BASOPHILS NFR BLD AUTO: 0.8 % (ref 0–1.5)
BILIRUB SERPL-MCNC: 0.6 MG/DL (ref 0–1.2)
BUN SERPL-MCNC: 13 MG/DL (ref 8–23)
BUN/CREAT SERPL: 17.6 (ref 7–25)
CALCIUM SPEC-SCNC: 9.7 MG/DL (ref 8.6–10.5)
CHLORIDE SERPL-SCNC: 103 MMOL/L (ref 98–107)
CHOLEST SERPL-MCNC: 184 MG/DL (ref 0–200)
CO2 SERPL-SCNC: 28.4 MMOL/L (ref 22–29)
CREAT SERPL-MCNC: 0.74 MG/DL (ref 0.57–1)
DEPRECATED RDW RBC AUTO: 40.6 FL (ref 37–54)
EGFRCR SERPLBLD CKD-EPI 2021: 85.6 ML/MIN/1.73
EOSINOPHIL # BLD AUTO: 0.1 10*3/MM3 (ref 0–0.4)
EOSINOPHIL NFR BLD AUTO: 2.1 % (ref 0.3–6.2)
ERYTHROCYTE [DISTWIDTH] IN BLOOD BY AUTOMATED COUNT: 12.6 % (ref 12.3–15.4)
GLOBULIN UR ELPH-MCNC: 3 GM/DL
GLUCOSE SERPL-MCNC: 95 MG/DL (ref 65–99)
HCT VFR BLD AUTO: 38.1 % (ref 34–46.6)
HDLC SERPL-MCNC: 84 MG/DL (ref 40–60)
HGB BLD-MCNC: 12.8 G/DL (ref 12–15.9)
IMM GRANULOCYTES # BLD AUTO: 0.02 10*3/MM3 (ref 0–0.05)
IMM GRANULOCYTES NFR BLD AUTO: 0.4 % (ref 0–0.5)
LDLC SERPL CALC-MCNC: 89 MG/DL (ref 0–100)
LDLC/HDLC SERPL: 1.06 {RATIO}
LYMPHOCYTES # BLD AUTO: 1.15 10*3/MM3 (ref 0.7–3.1)
LYMPHOCYTES NFR BLD AUTO: 23.7 % (ref 19.6–45.3)
MCH RBC QN AUTO: 29.8 PG (ref 26.6–33)
MCHC RBC AUTO-ENTMCNC: 33.6 G/DL (ref 31.5–35.7)
MCV RBC AUTO: 88.8 FL (ref 79–97)
MONOCYTES # BLD AUTO: 0.38 10*3/MM3 (ref 0.1–0.9)
MONOCYTES NFR BLD AUTO: 7.8 % (ref 5–12)
NEUTROPHILS NFR BLD AUTO: 3.16 10*3/MM3 (ref 1.7–7)
NEUTROPHILS NFR BLD AUTO: 65.2 % (ref 42.7–76)
NRBC BLD AUTO-RTO: 0 /100 WBC (ref 0–0.2)
PLATELET # BLD AUTO: 195 10*3/MM3 (ref 140–450)
PMV BLD AUTO: 11.3 FL (ref 6–12)
POTASSIUM SERPL-SCNC: 4.2 MMOL/L (ref 3.5–5.2)
PROT SERPL-MCNC: 7.5 G/DL (ref 6–8.5)
RBC # BLD AUTO: 4.29 10*6/MM3 (ref 3.77–5.28)
SODIUM SERPL-SCNC: 141 MMOL/L (ref 136–145)
T4 FREE SERPL-MCNC: 1.39 NG/DL (ref 0.93–1.7)
TRIGL SERPL-MCNC: 54 MG/DL (ref 0–150)
TSH SERPL DL<=0.05 MIU/L-ACNC: 3.1 UIU/ML (ref 0.27–4.2)
VLDLC SERPL-MCNC: 11 MG/DL (ref 5–40)
WBC NRBC COR # BLD: 4.85 10*3/MM3 (ref 3.4–10.8)

## 2022-05-06 PROCEDURE — 84443 ASSAY THYROID STIM HORMONE: CPT

## 2022-05-06 PROCEDURE — 82306 VITAMIN D 25 HYDROXY: CPT

## 2022-05-06 PROCEDURE — 84439 ASSAY OF FREE THYROXINE: CPT

## 2022-05-06 PROCEDURE — 85025 COMPLETE CBC W/AUTO DIFF WBC: CPT

## 2022-05-06 PROCEDURE — 36415 COLL VENOUS BLD VENIPUNCTURE: CPT

## 2022-05-06 PROCEDURE — 80053 COMPREHEN METABOLIC PANEL: CPT

## 2022-05-06 PROCEDURE — 80061 LIPID PANEL: CPT

## 2022-05-19 ENCOUNTER — TELEPHONE (OUTPATIENT)
Dept: SURGERY | Facility: CLINIC | Age: 72
End: 2022-05-19

## 2022-05-19 NOTE — TELEPHONE ENCOUNTER
Caller: Agnieszka Nettles    Relationship to patient: Self    Best call back number: [phone]    Type of visit: CHEST CT F/U    Requested date: UNDETERMINED     If rescheduling, when is the original appointment: 6/15/22     Additional notes: PT RESCHEDULED CHEST CT FOR 7/5/22 DUE TO A TRAVELING OPPORTUNITY. WOULD LIKE A CALL BACK IN THE NEXT COUPLE OF DAYS TO SCHEDULE THE F/U OFFICE VISIT.

## 2022-05-23 ENCOUNTER — TELEPHONE (OUTPATIENT)
Dept: INTERNAL MEDICINE | Facility: CLINIC | Age: 72
End: 2022-05-23

## 2022-05-23 ENCOUNTER — TELEMEDICINE (OUTPATIENT)
Dept: INTERNAL MEDICINE | Facility: CLINIC | Age: 72
End: 2022-05-23

## 2022-05-23 VITALS — TEMPERATURE: 97.9 F

## 2022-05-23 DIAGNOSIS — R53.83 FATIGUE, UNSPECIFIED TYPE: ICD-10-CM

## 2022-05-23 DIAGNOSIS — Z20.822 COUGH WITH EXPOSURE TO COVID-19 VIRUS: ICD-10-CM

## 2022-05-23 DIAGNOSIS — R05.8 COUGH WITH EXPOSURE TO COVID-19 VIRUS: ICD-10-CM

## 2022-05-23 DIAGNOSIS — U07.1 UPPER RESPIRATORY TRACT INFECTION DUE TO COVID-19 VIRUS: Primary | ICD-10-CM

## 2022-05-23 DIAGNOSIS — J06.9 UPPER RESPIRATORY TRACT INFECTION DUE TO COVID-19 VIRUS: Primary | ICD-10-CM

## 2022-05-23 PROCEDURE — 99213 OFFICE O/P EST LOW 20 MIN: CPT | Performed by: INTERNAL MEDICINE

## 2022-05-23 NOTE — TELEPHONE ENCOUNTER
Caller: Agnieszka Nettles    Relationship to patient: Self    Best call back number: [phone]    Patient is needing: PATIENT CALLED IN AND SAID THAT SHE TESTED POSITIVE FOR COVID ON Saturday AND IS COUGHING, HAVING A LOW GRADE FEVER, HAVING BODY ACHES. PATIENT REQUESTS A CALL BACK WITH ADVICE PLEASE. SHE SAID HER COUGH IS HER WORST SYMPTOM.      [id number] [place] - [address] AT [facility] [phone] [facility] [phone]   [phone]

## 2022-05-23 NOTE — PROGRESS NOTES
Patient is here for telehealth visit, consents to visit, patient is being seen by video and total time spent on visit      Chief Complaint/ HPI: covid sxs, cough, worse at night, dry hacking   For 3 days , chest hurts , patient took a home home positive COVID test on Saturday morning, May 21, 2022, she has been coughing a lot not feeling well general malaise,low grade fever for prior 2 nights   Fatigued          Objective   Vital Signs  Vitals:    05/23/22 1627   Temp: 97.9 °F (36.6 °C)      There is no height or weight on file to calculate BMI.  Review of Systems no hemoptysis, clear nasal drainage     Physical Exam no rashes , no wheezing with insp/ exp  No palpitations   Result Review :   No results found for: PROBNP, BNP  CMP    CMP 9/27/21 3/14/22 5/6/22   Glucose   95   BUN   13   Creatinine   0.74   Sodium   141   Potassium   4.2   Chloride   103   Calcium 10.8 (A) 10.7 (A) 9.7   Albumin   4.50   Total Bilirubin   0.6   Alkaline Phosphatase   47   AST (SGOT)   24   ALT (SGPT)   26   (A) Abnormal value            CBC w/diff    CBC w/Diff 6/14/21 5/6/22   WBC 3.49 4.85   RBC 4.14 4.29   Hemoglobin 12.7 12.8   Hematocrit 37.4 38.1   MCV 90.3 88.8   MCH 30.7 29.8   MCHC 34.0 33.6   RDW 12.7 12.6   Platelets 186 195   Neutrophil Rel % 50.4 65.2   Immature Granulocyte Rel % 0.0 0.4   Lymphocyte Rel % 37.2 23.7   Monocyte Rel % 9.2 7.8   Eosinophil Rel % 2.3 2.1   Basophil Rel % 0.9 0.8            Lipid Panel    Lipid Panel 5/6/22   Total Cholesterol 184   Triglycerides 54   HDL Cholesterol 84 (A)   VLDL Cholesterol 11   LDL Cholesterol  89   LDL/HDL Ratio 1.06   (A) Abnormal value             Lab Results   Component Value Date    TSH 3.100 05/06/2022    TSH 3.120 06/14/2021    TSH 3.330 07/27/2020      Lab Results   Component Value Date    FREET4 1.39 05/06/2022    FREET4 1.16 06/14/2021    FREET4 1.3 07/27/2020                          Visit Diagnoses:    ICD-10-CM ICD-9-CM   1. Upper respiratory tract infection  due to COVID-19 virus  U07.1 465.9    J06.9 079.89   2. Cough with exposure to COVID-19 virus  R05.8 786.2    Z20.822 V01.79   3. Fatigue, unspecified type  R53.83 780.79       Assessment and Plan   Diagnoses and all orders for this visit:    1. Upper respiratory tract infection due to COVID-19 virus (Primary)  -     HYDROcod Polst-CPM Polst ER (Tussionex Pennkinetic ER) 10-8 MG/5ML ER suspension; Take 5 mL by mouth Every 12 (Twelve) Hours As Needed (100).  Dispense: 100 mL; Refill: 0    2. Cough with exposure to COVID-19 virus  -     HYDROcod Polst-CPM Polst ER (Tussionex Pennkinetic ER) 10-8 MG/5ML ER suspension; Take 5 mL by mouth Every 12 (Twelve) Hours As Needed (100).  Dispense: 100 mL; Refill: 0    3. Fatigue, unspecified type  -     HYDROcod Polst-CPM Polst ER (Tussionex Pennkinetic ER) 10-8 MG/5ML ER suspension; Take 5 mL by mouth Every 12 (Twelve) Hours As Needed (100).  Dispense: 100 mL; Refill: 0    Other orders  -     Molnupiravir (LAGEVRIO) 200 MG capsule; Take 4 capsules by mouth Every 12 (Twelve) Hours for 5 days.  Dispense: 40 capsule; Refill: 0        covid infection, uri sxs dx fri may 20, 2022---        Follow Up   No follow-ups on file.  Patient was given instructions and counseling regarding her condition or for health maintenance advice. Please see specific information pulled into the AVS if appropriate.

## 2022-06-08 ENCOUNTER — APPOINTMENT (OUTPATIENT)
Dept: CT IMAGING | Facility: HOSPITAL | Age: 72
End: 2022-06-08

## 2022-07-05 ENCOUNTER — APPOINTMENT (OUTPATIENT)
Dept: CT IMAGING | Facility: HOSPITAL | Age: 72
End: 2022-07-05

## 2022-07-19 ENCOUNTER — OFFICE VISIT (OUTPATIENT)
Dept: INTERNAL MEDICINE | Facility: CLINIC | Age: 72
End: 2022-07-19

## 2022-07-19 VITALS
HEIGHT: 68 IN | WEIGHT: 149.6 LBS | HEART RATE: 74 BPM | OXYGEN SATURATION: 96 % | DIASTOLIC BLOOD PRESSURE: 57 MMHG | SYSTOLIC BLOOD PRESSURE: 109 MMHG | TEMPERATURE: 98.6 F | BODY MASS INDEX: 22.67 KG/M2

## 2022-07-19 DIAGNOSIS — K64.9 HEMORRHOIDS, UNSPECIFIED HEMORRHOID TYPE: ICD-10-CM

## 2022-07-19 DIAGNOSIS — E55.9 VITAMIN D DEFICIENCY: ICD-10-CM

## 2022-07-19 DIAGNOSIS — R91.1 SOLITARY PULMONARY NODULE: ICD-10-CM

## 2022-07-19 DIAGNOSIS — R53.83 FATIGUE, UNSPECIFIED TYPE: ICD-10-CM

## 2022-07-19 DIAGNOSIS — R91.8 PULMONARY NODULES/LESIONS, MULTIPLE: ICD-10-CM

## 2022-07-19 DIAGNOSIS — M81.0 AGE-RELATED OSTEOPOROSIS WITHOUT CURRENT PATHOLOGICAL FRACTURE: ICD-10-CM

## 2022-07-19 DIAGNOSIS — E03.8 HYPOTHYROIDISM DUE TO HASHIMOTO'S THYROIDITIS: ICD-10-CM

## 2022-07-19 DIAGNOSIS — Z00.00 PHYSICAL EXAM, ANNUAL: Primary | ICD-10-CM

## 2022-07-19 DIAGNOSIS — K21.9 GASTROESOPHAGEAL REFLUX DISEASE WITHOUT ESOPHAGITIS: ICD-10-CM

## 2022-07-19 DIAGNOSIS — D64.9 ANEMIA, UNSPECIFIED TYPE: ICD-10-CM

## 2022-07-19 DIAGNOSIS — D72.819 CHRONIC LEUKOPENIA: ICD-10-CM

## 2022-07-19 DIAGNOSIS — E06.3 HYPOTHYROIDISM DUE TO HASHIMOTO'S THYROIDITIS: ICD-10-CM

## 2022-07-19 PROCEDURE — 99214 OFFICE O/P EST MOD 30 MIN: CPT | Performed by: INTERNAL MEDICINE

## 2022-07-19 NOTE — PROGRESS NOTES
"Answers for HPI/ROS submitted by the patient on 7/14/2022  What is the primary reason for your visit?: Physical    Chief Complaint/ HPI: f/ uwti h annual exam  Wants sooner c scope / egd with dr jimenez   Patient's had is some blood in her stools has had hemorrhoids in the past, has an appointment with GI as above, is only happen once,          Objective   Vital Signs  Vitals:    07/19/22 1514   BP: 109/57   Pulse: 74   Temp: 98.6 °F (37 °C)   SpO2: 96%   Weight: 67.9 kg (149 lb 9.6 oz)   Height: 171.5 cm (67.52\")      Body mass index is 23.07 kg/m².  Review of Systems   Constitutional: Negative.    HENT: Negative.    Eyes: Negative.    Respiratory: Negative.    Cardiovascular: Negative.    Gastrointestinal: Negative.    Endocrine: Negative.    Genitourinary: Negative.    Musculoskeletal: Negative.    Allergic/Immunologic: Negative.    Neurological: Negative.    Hematological: Negative.    Psychiatric/Behavioral: Negative.       Physical Exam   Result Review :   No results found for: PROBNP, BNP  CMP    CMP 9/27/21 3/14/22 5/6/22   Glucose   95   BUN   13   Creatinine   0.74   Sodium   141   Potassium   4.2   Chloride   103   Calcium 10.8 (A) 10.7 (A) 9.7   Albumin   4.50   Total Bilirubin   0.6   Alkaline Phosphatase   47   AST (SGOT)   24   ALT (SGPT)   26   (A) Abnormal value            CBC w/diff    CBC w/Diff 5/6/22   WBC 4.85   RBC 4.29   Hemoglobin 12.8   Hematocrit 38.1   MCV 88.8   MCH 29.8   MCHC 33.6   RDW 12.6   Platelets 195   Neutrophil Rel % 65.2   Immature Granulocyte Rel % 0.4   Lymphocyte Rel % 23.7   Monocyte Rel % 7.8   Eosinophil Rel % 2.1   Basophil Rel % 0.8            Lipid Panel    Lipid Panel 5/6/22   Total Cholesterol 184   Triglycerides 54   HDL Cholesterol 84 (A)   VLDL Cholesterol 11   LDL Cholesterol  89   LDL/HDL Ratio 1.06   (A) Abnormal value             Lab Results   Component Value Date    TSH 3.100 05/06/2022    TSH 3.120 06/14/2021    TSH 3.330 07/27/2020      Lab Results "   Component Value Date    FREET4 1.39 05/06/2022    FREET4 1.16 06/14/2021    FREET4 1.3 07/27/2020                          Visit Diagnoses:    ICD-10-CM ICD-9-CM   1. Physical exam, annual  Z00.00 V70.0   2. Chronic leukopenia  D72.819 288.50   3. Fatigue, unspecified type  R53.83 780.79   4. Hemorrhoids, unspecified hemorrhoid type  K64.9 455.6   5. Vitamin D deficiency  E55.9 268.9   6. Anemia, unspecified type  D64.9 285.9   7. Solitary pulmonary nodule  R91.1 793.11   8. Gastroesophageal reflux disease without esophagitis  K21.9 530.81   9. Age-related osteoporosis without current pathological fracture  M81.0 733.01   10. Hypothyroidism due to Hashimoto's thyroiditis  E03.8 244.8    E06.3 245.2   11. Pulmonary nodules/lesions, multiple  R91.8 793.19       Assessment and Plan   Diagnoses and all orders for this visit:    1. Physical exam, annual (Primary)  -     CBC & Differential; Future  -     Comprehensive Metabolic Panel; Future  -     Lipid Panel; Future  -     TSH+Free T4; Future    2. Chronic leukopenia  -     CBC & Differential; Future  -     Comprehensive Metabolic Panel; Future  -     Lipid Panel; Future  -     TSH+Free T4; Future    3. Fatigue, unspecified type  -     CBC & Differential; Future  -     Comprehensive Metabolic Panel; Future  -     Lipid Panel; Future  -     TSH+Free T4; Future    4. Hemorrhoids, unspecified hemorrhoid type  -     CBC & Differential; Future  -     Comprehensive Metabolic Panel; Future  -     Lipid Panel; Future  -     TSH+Free T4; Future    5. Vitamin D deficiency  -     CBC & Differential; Future  -     Comprehensive Metabolic Panel; Future  -     Lipid Panel; Future  -     TSH+Free T4; Future    6. Anemia, unspecified type  -     CBC & Differential; Future  -     Comprehensive Metabolic Panel; Future  -     Lipid Panel; Future  -     TSH+Free T4; Future    7. Solitary pulmonary nodule  -     CBC & Differential; Future  -     Comprehensive Metabolic Panel; Future  -      Lipid Panel; Future  -     TSH+Free T4; Future    8. Gastroesophageal reflux disease without esophagitis  -     CBC & Differential; Future  -     Comprehensive Metabolic Panel; Future  -     Lipid Panel; Future  -     TSH+Free T4; Future    9. Age-related osteoporosis without current pathological fracture  -     CBC & Differential; Future  -     Comprehensive Metabolic Panel; Future  -     Lipid Panel; Future  -     TSH+Free T4; Future    10. Hypothyroidism due to Hashimoto's thyroiditis  -     CBC & Differential; Future  -     Comprehensive Metabolic Panel; Future  -     Lipid Panel; Future  -     TSH+Free T4; Future    11. Pulmonary nodules/lesions, multiple  -     CBC & Differential; Future  -     Comprehensive Metabolic Panel; Future  -     Lipid Panel; Future  -     TSH+Free T4; Future        Constipation issues, takes MiraLAX twice a week and Colace daily,    DISCUSSED CT CHEST /ECHO WITH MOTHER HAVING SEVERE AORTIC DISSECITON ? AUG 2020 ---CT scan of the chest shows some noncalcified pulmonary nodules right upper lung, no evidence of aortic dissection, December 15, 2020 ---No changes from the previous CT scan in September 2020, ---- 10 years of smoking plus secondhand smoke for a longer period of time, quit smoking 1981.---Dr. Charles's group in [place], PET SCAN IN DEC 2020 PER DR CHARLES , --CT scan J CT of the chest, December 7, 2021 showed stable postinflammatory fibronodular changes in the lung apices no new suspicious abnormalities no further follow-up was recommended, patient does have a follow-up with Dr. Charles--August 2022,    dyspagia, ? globus, sensation, HAS SEEN DR BECKHAM, AND dr silva TIMES 3 in [place], THRYOID NODULE WILL DO U/S THYROID , patient has a CT scan of the chest coming up should be able to review the thyroid to some degree,    FATIGUE, --DID SEE DR BRITTANI LONG AND DR MOULTON----NEG SJORGENS, POSTIVE HASHIMOTOS ABS, O/W NEG     RAYSHAWN, - , on cpap now june 2017, using Nasonex,---does f/u  with sleep med,     HYPOTHRYOIDISM,------- continues SYNTHROID 0.088 MG QD,     GERD-- --, better since on bipap    OSTEOPORSIS BY BONE DENSITY 2015.  And again April 2022 --- continues ON PROLIA PER [facility] IN [place]     DRY MOUTH    HEMORRHOIDS, CHOLEY, CARPAL TUNNEL, BREAST AUGMENTATION 2001  VIT D DEF.     C SCOPE DR BEGUM, 2013, 2018,     MAMMO PER GYN IN [place]          Follow Up   Return in about 9 months (around 4/19/2023).  Patient was given instructions and counseling regarding her condition or for health maintenance advice. Please see specific information pulled into the AVS if appropriate.

## 2022-08-08 ENCOUNTER — HOSPITAL ENCOUNTER (OUTPATIENT)
Dept: CT IMAGING | Facility: HOSPITAL | Age: 72
Discharge: HOME | End: 2022-08-08
Admitting: THORACIC SURGERY (CARDIOTHORACIC VASCULAR SURGERY)

## 2022-08-08 DIAGNOSIS — R91.8 PULMONARY NODULES/LESIONS, MULTIPLE: ICD-10-CM

## 2022-08-08 PROCEDURE — 71250 CT THORAX DX C-: CPT

## 2022-08-15 ENCOUNTER — LAB (OUTPATIENT)
Dept: LAB | Facility: HOSPITAL | Age: 72
End: 2022-08-15

## 2022-08-15 ENCOUNTER — TRANSCRIBE ORDERS (OUTPATIENT)
Dept: LAB | Facility: HOSPITAL | Age: 72
End: 2022-08-15

## 2022-08-15 DIAGNOSIS — R20.8 BURNING SENSATION: ICD-10-CM

## 2022-08-15 DIAGNOSIS — D18.01 HEMANGIOMA OF SKIN AND SUBCUTANEOUS TISSUE: ICD-10-CM

## 2022-08-15 DIAGNOSIS — L82.0 INFLAMED SEBORRHEIC KERATOSIS: ICD-10-CM

## 2022-08-15 DIAGNOSIS — G90.09 OTHER IDIOPATHIC PERIPHERAL AUTONOMIC NEUROPATHY: ICD-10-CM

## 2022-08-15 DIAGNOSIS — L53.8 ERYTHEMA SCARLATINIFORME: ICD-10-CM

## 2022-08-15 DIAGNOSIS — L82.1 OTHER SEBORRHEIC KERATOSIS: Primary | ICD-10-CM

## 2022-08-15 DIAGNOSIS — L82.1 OTHER SEBORRHEIC KERATOSIS: ICD-10-CM

## 2022-08-15 LAB
FOLATE SERPL-MCNC: >20 NG/ML (ref 4.78–24.2)
HBA1C MFR BLD: 5.7 % (ref 4.8–5.6)
VIT B12 BLD-MCNC: 1298 PG/ML (ref 211–946)

## 2022-08-15 PROCEDURE — 36415 COLL VENOUS BLD VENIPUNCTURE: CPT

## 2022-08-15 PROCEDURE — 84591 ASSAY OF NOS VITAMIN: CPT

## 2022-08-15 PROCEDURE — 83036 HEMOGLOBIN GLYCOSYLATED A1C: CPT

## 2022-08-15 PROCEDURE — 82607 VITAMIN B-12: CPT

## 2022-08-15 PROCEDURE — 82746 ASSAY OF FOLIC ACID SERUM: CPT

## 2022-08-16 ENCOUNTER — OFFICE VISIT (OUTPATIENT)
Dept: GASTROENTEROLOGY | Facility: CLINIC | Age: 72
End: 2022-08-16

## 2022-08-16 ENCOUNTER — PREP FOR SURGERY (OUTPATIENT)
Dept: OTHER | Facility: HOSPITAL | Age: 72
End: 2022-08-16

## 2022-08-16 VITALS
HEIGHT: 68 IN | DIASTOLIC BLOOD PRESSURE: 61 MMHG | OXYGEN SATURATION: 100 % | HEART RATE: 64 BPM | BODY MASS INDEX: 21.98 KG/M2 | WEIGHT: 145 LBS | SYSTOLIC BLOOD PRESSURE: 116 MMHG

## 2022-08-16 DIAGNOSIS — K62.5 RECTAL BLEEDING: ICD-10-CM

## 2022-08-16 DIAGNOSIS — K21.9 GASTROESOPHAGEAL REFLUX DISEASE WITHOUT ESOPHAGITIS: Primary | ICD-10-CM

## 2022-08-16 DIAGNOSIS — K59.01 SLOW TRANSIT CONSTIPATION: ICD-10-CM

## 2022-08-16 PROCEDURE — 99214 OFFICE O/P EST MOD 30 MIN: CPT | Performed by: INTERNAL MEDICINE

## 2022-08-16 RX ORDER — MULTIVIT,CALC,MINS/IRON/FOLIC 500-18-0.4
TABLET ORAL DAILY
COMMUNITY

## 2022-08-16 RX ORDER — SODIUM, POTASSIUM,MAG SULFATES 17.5-3.13G
2 SOLUTION, RECONSTITUTED, ORAL ORAL TAKE AS DIRECTED
Qty: 177 ML | Refills: 0 | Status: ON HOLD | OUTPATIENT
Start: 2022-08-16 | End: 2022-11-02

## 2022-08-16 RX ORDER — CYCLOSPORINE 0.5 MG/ML
1 EMULSION OPHTHALMIC 2 TIMES DAILY
COMMUNITY

## 2022-08-16 NOTE — PROGRESS NOTES
"Chief Complaint    Agnieszka Nettles is a 72 y.o. female who presents to [facility] for follow-up of chronic constipation as well as a family history of colon cancer in her brother, Juan Miguel Henry.  Patient's last colonoscopy was about 4 years ago with Dr. Walsh.  Constipation recently has been well controlled with the daily use of MiraLAX however about 3 weeks ago she had a few days of rectal bleeding.  She described bright red blood per rectum that lasted for 3 days and that seemingly has now resolved.  She also has a history of esophageal reflux and previously was on Nexium.  She uses a CPAP and has had some recent adjustments to the settings.  She was having intermittent episodes of epigastric pain at night that would be relieved by stopping the CPAP pressure.    Result Review :     The following data was reviewed by: Sanchez Keith MD on 08/16/2022:        Data reviewed: GI studies Reviewed     Past Medical History:   Diagnosis Date   • Anemia    • Arthritis Not sure   • Cervical disc disorder    • CTS (carpal tunnel syndrome)    • Dissection of thoracic aorta (HCC)    • GERD (gastroesophageal reflux disease)    • Hypothyroidism, unspecified    • Lung nodule    • Osteoporosis    • Other specified disorders of white blood cells    • Sleep apnea    • Solitary pulmonary nodule    • Thyroid disease    • Vitamin D deficiency        Past Surgical History:   Procedure Laterality Date   • BREAST AUGMENTATION  2001   • CARPAL TUNNEL RELEASE  2006   • CHOLECYSTECTOMY  2005   • COLONOSCOPY  2018   • ENDOSCOPY  2016   • HEMORROIDECTOMY  1979   • HYSTERECTOMY  2010       Social History     Social History Narrative   • Not on file       Objective     Vital Signs:   /61   Pulse 64   Ht 171.5 cm (67.5\")   Wt 65.8 kg (145 lb)   SpO2 100%   BMI 22.38 kg/m²     Body mass index is 22.38 kg/m².    Physical Exam            Assessment and Plan    Diagnoses and all orders for this " visit:    1. Gastroesophageal reflux disease without esophagitis (Primary)    2. Rectal bleeding    3. Slow transit constipation    The patient has a history of constipation which is well controlled with daily MiraLAX however she had some recent rectal bleeding.  It has been 4 years since her last colonoscopy and therefore I will evaluate her symptoms with a colonoscopy.  We will also pursue an upper endoscopy given her history of reflux and recent episodes of epigastric pain that have been very intermittent and short-lived but do occur at night while wearing her CPAP.  I discussed risk and benefits of EGD and colonoscopy and she is willing to proceed.              Follow Up   No follow-ups on file.  Patient was given instructions and counseling regarding her condition or for health maintenance advice. Please see specific information pulled into the AVS if appropriate.

## 2022-08-17 ENCOUNTER — OFFICE VISIT (OUTPATIENT)
Dept: SURGERY | Facility: CLINIC | Age: 72
End: 2022-08-17

## 2022-08-17 VITALS
SYSTOLIC BLOOD PRESSURE: 112 MMHG | HEART RATE: 69 BPM | BODY MASS INDEX: 21.98 KG/M2 | DIASTOLIC BLOOD PRESSURE: 72 MMHG | HEIGHT: 68 IN | OXYGEN SATURATION: 97 % | WEIGHT: 145 LBS

## 2022-08-17 DIAGNOSIS — R91.8 PULMONARY NODULES/LESIONS, MULTIPLE: Primary | ICD-10-CM

## 2022-08-17 PROCEDURE — 99212 OFFICE O/P EST SF 10 MIN: CPT | Performed by: NURSE PRACTITIONER

## 2022-08-17 NOTE — PROGRESS NOTES
"Chief Complaint  Pulmonary nodules, follow-up    Subjective        Agnieszka Nettles presents to [facility] for continued follow-up and surveillance of pulmonary nodules.    History of Present Illness     Agnieszka Nettles is a pleasant 72-year-old female who presented to our office today for continued follow-up and surveillance of multiple pulmonary nodules.  We have been seeing the patient since January 2021.  Her initial imaging dates back to September 2020.  The patient reports she has been doing well.  She denies any pulmonary complaints.  She has had no fever, chills, cough, hemoptysis.  She denies wheezing, pleuritic pain, change in voice or unexplained weight loss.  Patient presents with repeat CT of the chest without contrast.    Objective   Vital Signs:  /72 (BP Location: Left arm, Patient Position: Sitting, Cuff Size: Adult)   Pulse 69   Ht 171.5 cm (67.5\")   Wt 65.8 kg (145 lb)   SpO2 97%   BMI 22.38 kg/m²   Estimated body mass index is 22.38 kg/m² as calculated from the following:    Height as of this encounter: 171.5 cm (67.5\").    Weight as of this encounter: 65.8 kg (145 lb).    BMI is within normal parameters. No other follow-up for BMI required.      Physical Exam  Vitals and nursing note reviewed.   Constitutional:       Appearance: Normal appearance.   HENT:      Head: Normocephalic and atraumatic.   Cardiovascular:      Rate and Rhythm: Normal rate and regular rhythm.      Pulses: Normal pulses.      Heart sounds: Normal heart sounds.   Pulmonary:      Effort: Pulmonary effort is normal.      Breath sounds: Normal breath sounds. No wheezing, rhonchi or rales.   Abdominal:      General: Bowel sounds are normal.      Palpations: Abdomen is soft.   Neurological:      General: No focal deficit present.      Mental Status: She is alert. Mental status is at baseline.        Result Review :  The following data was reviewed by: GHAZALA Dewitt on " 08/17/2022:    Data reviewed: Radiologic studies CT of the chest performed on 8/8/2022 was independently reviewed.  There is stable appearance of pulmonary nodules with no evidence of any new or enlarging lesions.  Aortic vascular calcifications are present.         Assessment and Plan   Diagnoses and all orders for this visit:    1. Pulmonary nodules/lesions, multiple (Primary)      Stable imaging of pulmonary nodules dating back to September 2020.  With no change in appearance over 2 years, we can assume benign etiology.  Ms. Nettles can follow-up with us on an as-needed basis.  Of course, we are happy to see her at any time, should the need arise.  Thank you for allowing us to participate in her care.     I spent 15 minutes caring for Agnieszka on this date of service. This time includes time spent by me in the following activities:preparing for the visit, reviewing tests, obtaining and/or reviewing a separately obtained history, performing a medically appropriate examination and/or evaluation , counseling and educating the patient/family/caregiver, referring and communicating with other health care professionals , documenting information in the medical record and independently interpreting results and communicating that information with the patient/family/caregiver   Follow Up   Return if symptoms worsen or fail to improve.  Patient was given instructions and counseling regarding her condition or for health maintenance advice. Please see specific information pulled into the AVS if appropriate.

## 2022-08-22 LAB
NIACIN SERPL-MCNC: <5 NG/ML (ref 0–5)
NICOTINAMIDE SERPL-MCNC: 10.1 NG/ML (ref 5.2–72.1)

## 2022-09-01 ENCOUNTER — OFFICE VISIT (OUTPATIENT)
Dept: SLEEP MEDICINE | Facility: HOSPITAL | Age: 72
End: 2022-09-01

## 2022-09-01 VITALS
DIASTOLIC BLOOD PRESSURE: 63 MMHG | WEIGHT: 145.1 LBS | OXYGEN SATURATION: 100 % | BODY MASS INDEX: 21.99 KG/M2 | SYSTOLIC BLOOD PRESSURE: 118 MMHG | HEART RATE: 75 BPM | HEIGHT: 68 IN

## 2022-09-01 DIAGNOSIS — G47.33 OSA (OBSTRUCTIVE SLEEP APNEA): ICD-10-CM

## 2022-09-01 PROCEDURE — G0463 HOSPITAL OUTPT CLINIC VISIT: HCPCS | Performed by: INTERNAL MEDICINE

## 2022-09-01 NOTE — PROGRESS NOTES
"      [facility]         Dr Rubi Merrill  [unfilled]  Patient Care Team:  Joaquin Howe MD as PCP - General (Internal Medicine)    Chief Complaint:initial sleep study showed AHI 10.7 events per hour    Interval History: Patient here for compliance visit.  As you recall her Hernadez CPAP was replaced and currently on CPAP 9 cm.  Compliance today 88% average daily use 5 hours 47 minutes.  AHI leak look excellent.  She tells me she is much improved with the CPAP.  Goes to bed 10 gets up 530 gets about 6 hours of sleep and feels more rested.  No tobacco no alcohol or caffeine abuse.  Currently has a nasal mask that fits well.  Supplies have been adequate.    REVIEW OF SYSTEMS:   CARDIOVASCULAR: No chest pain, chest pressure or chest discomfort. No palpitations or edema.   RESPIRATORY: No shortness of breath, cough or sputum.   GASTROINTESTINAL: No anorexia, nausea, vomiting or diarrhea. No abdominal pain or blood.   HEMATOLOGIC: No bleeding or bruising.  Positive for nasal congestion dry mouth acid reflux abdominal bloating    Ventilator/Non-Invasive Ventilation Settings (From admission, onward)            None            Vital Signs  Heart Rate:  [75] 75  BP: (118)/(63) 118/63  [unfilled]  Flowsheet Rows    Flowsheet Row First Filed Value   Admission Height 172.7 cm (67.99\") Documented at 09/01/2022 1100   Admission Weight 65.8 kg (145 lb 1.6 oz) Documented at 09/01/2022 1100          Physical Exam:  Patient is examined using the personal protective equipment as per guidelines from infection control for this particular patient as enacted.  Hand hygiene was performed before and after patient interaction.   General Appearance:    Alert, cooperative, in no acute distress.  Following simple commands  ENT Mallampati between 3 and 4 no nasal congestion  Neck midline trachea, no thyromegaly   Lungs:     Clear to auscultation, respirations regular, even and                  unlabored    Heart:    Regular " rhythm and normal rate, normal S1 and S2, no            murmur, no gallop, no rub, no click   Chest Wall:    No abnormalities observed   Abdomen:     Normal bowel sounds, no masses, no organomegaly, soft        nontender, nondistended, no guarding, no rebound                tenderness   Extremities:   Moves all extremities well, no edema, no cyanosis, no             redness  CNS no focal neurological deficits normal sensory exam  Skin no rashes no nodules  Musculoskeletal no cyanosis no clubbing normal range of motion     Results Review:                                          I reviewed the patient's new clinical results.  I personally viewed and interpreted the patient's chest x-ray.        Medication Review:       No current facility-administered medications for this visit.      ASSESSMENT:   Obstructive sleep apnea.    2.  Gastroesophageal reflux disease.    3.  Allergic rhinitis.    4.  Hypothyroidism.     PLAN:  Reviewed compliance download the patient  Excellent compliance AHI leak  Continue current CPAP 9 cm  Sleep hygiene measures  Treatment underlying comorbidities  Treatment of allergies currently on nasal steroids  We will follow-up in 1 year      Rubi Merrill MD  09/01/22  11:51 EDT

## 2022-10-06 ENCOUNTER — OFFICE VISIT (OUTPATIENT)
Dept: ORTHOPEDIC SURGERY | Facility: CLINIC | Age: 72
End: 2022-10-06

## 2022-10-06 VITALS — RESPIRATION RATE: 16 BRPM | TEMPERATURE: 96.7 F | WEIGHT: 149 LBS | BODY MASS INDEX: 22.58 KG/M2 | HEIGHT: 68 IN

## 2022-10-06 DIAGNOSIS — M70.61 TROCHANTERIC BURSITIS OF RIGHT HIP: Primary | ICD-10-CM

## 2022-10-06 PROCEDURE — 20610 DRAIN/INJ JOINT/BURSA W/O US: CPT | Performed by: ORTHOPAEDIC SURGERY

## 2022-10-06 PROCEDURE — 73502 X-RAY EXAM HIP UNI 2-3 VIEWS: CPT | Performed by: ORTHOPAEDIC SURGERY

## 2022-10-06 PROCEDURE — 99203 OFFICE O/P NEW LOW 30 MIN: CPT | Performed by: ORTHOPAEDIC SURGERY

## 2022-10-06 RX ORDER — METHYLPREDNISOLONE ACETATE 80 MG/ML
80 INJECTION, SUSPENSION INTRA-ARTICULAR; INTRALESIONAL; INTRAMUSCULAR; SOFT TISSUE
Status: COMPLETED | OUTPATIENT
Start: 2022-10-06 | End: 2022-10-06

## 2022-10-06 RX ADMIN — METHYLPREDNISOLONE ACETATE 80 MG: 80 INJECTION, SUSPENSION INTRA-ARTICULAR; INTRALESIONAL; INTRAMUSCULAR; SOFT TISSUE at 15:45

## 2022-10-06 NOTE — PROGRESS NOTES
Patient: Agnieszka Nettles  YOB: 1950 72 y.o. female  Medical Record Number: [id number]    Chief Complaints:   Chief Complaint   Patient presents with   • Right Hip - Initial Evaluation       History of Present Illness:Agnieszka Nettles is a 72 y.o. female who presents with complaints of right hip pain.  She has lateral aching especially when she lays on her side is worse over the last month or 2.  She had something similar to this over 5 years ago.    Allergies: No Known Allergies    Medications:   Current Outpatient Medications   Medication Sig Dispense Refill   • ascorbic acid (VITAMIN C) 250 MG tablet Take 500 mg by mouth Daily.     • cholecalciferol (VITAMIN D3) 1000 UNITS tablet Take 1,000 Units by mouth 2 (Two) Times a Day.     • cycloSPORINE (RESTASIS) 0.05 % ophthalmic emulsion 1 drop 2 (Two) Times a Day.     • Denosumab (PROLIA SC) Inject  under the skin into the appropriate area as directed Take As Directed.     • fluticasone (FLONASE) 50 MCG/ACT nasal spray INSTILL 2 SPRAYS IN EACH NOSTRIL EVERY DAY 16 g 3   • l-methylfolate-algae-B12-B6 3-90.314-2-35 MG capsule capsule Take  by mouth.     • levothyroxine (SYNTHROID, LEVOTHROID) 88 MCG tablet TAKE 1 TABLET BY MOUTH EVERY MORNING 90 tablet 3   • Multiple Vitamins-Calcium (One-A-Day Womens Formula) tablet Take  by mouth Daily.     • polyethylene glycol (MIRALAX) 17 g packet Daily.     • Respiratory Therapy Supplies (CareTouch CPAP Mask Wipes) misc Take As Directed.     • sodium-potassium-magnesium sulfates (Suprep Bowel Prep Kit) 17.5-3.13-1.6 GM/177ML solution oral solution Take 2 bottles by mouth Take As Directed. 177 mL 0   • vitamin B-12 (CYANOCOBALAMIN) 1000 MCG tablet Take 1,000 mcg by mouth 1 (One) Time Per Week.     • Zinc Sulfate (ZINC 15 PO) Take  by mouth Daily.       No current facility-administered medications for this visit.         The following portions of the patient's history were reviewed and updated as appropriate:  "allergies, current medications, past family history, past medical history, past social history, past surgical history and problem list.    Review of Systems:   A 14 point review of systems was performed. All systems negative except pertinent positives/negative listed in HPI above    Physical Exam:   Vitals:    10/06/22 1457   Resp: 16   Temp: 96.7 °F (35.9 °C)   TempSrc: Temporal   Weight: 67.6 kg (149 lb)   Height: 171.5 cm (67.52\")   PainSc: 0-No pain       General: A and O x 3, ASA, NAD    SCLERA:    Normal    DENTITION:   Normal  Hip:  right    LEG ALIGNMENT:     Neutral   ,    equal leg lengths    GAIT:     Nonantalgic    SKIN:     No abnormality    RANGE OF MOTION:      Full without joint irritability    STRENGTH:     5 / 5    hip flexion and abduction    DISTAL PULSES:    Paplable    DISTAL SENSATION :   Intact    LYMPHATICS:     No   lymphadenopathy    OTHER:          - Negative Stinchfeld test      - Negative log roll      +Tenderness to palpation trochanteric bursa        Radiology:  Xrays 2views right hip (AP bilateral hips, and lateral of the hip) ordered and reviewed for evaluation of hip pain  demonstrating  minimal arthritic findings  - there is some evidence of chronic change about the lateral trochanter c.w chronic bursitis. There are no previous films for comparision.    Assessment/Plan: Right hip trochanteric bursitis - injected as below, instructed on stretching exercises, rto prn    Large Joint Arthrocentesis: R greater trochanteric bursa  Date/Time: 10/6/2022 3:45 PM  Consent given by: patient  Site marked: site marked  Timeout: Immediately prior to procedure a time out was called to verify the correct patient, procedure, equipment, support staff and site/side marked as required   Supporting Documentation  Indications: pain   Procedure Details  Location: hip - R greater trochanteric bursa  Preparation: Patient was prepped and draped in the usual sterile fashion  Needle size: 18 G  Approach: " lateral  Medications administered: 80 mg methylPREDNISolone acetate 80 MG/ML; 2 mL lidocaine (cardiac)              Ahsan Villalobos MD  10/6/2022

## 2022-10-25 ENCOUNTER — TELEPHONE (OUTPATIENT)
Dept: GASTROENTEROLOGY | Facility: CLINIC | Age: 72
End: 2022-10-25

## 2022-11-01 RX ORDER — PHENOL 1.4 %
600 AEROSOL, SPRAY (ML) MUCOUS MEMBRANE DAILY
COMMUNITY

## 2022-11-02 ENCOUNTER — ANESTHESIA EVENT (OUTPATIENT)
Dept: GASTROENTEROLOGY | Facility: HOSPITAL | Age: 72
End: 2022-11-02

## 2022-11-02 ENCOUNTER — APPOINTMENT (OUTPATIENT)
Dept: CT IMAGING | Facility: HOSPITAL | Age: 72
End: 2022-11-02

## 2022-11-02 ENCOUNTER — ANESTHESIA (OUTPATIENT)
Dept: GASTROENTEROLOGY | Facility: HOSPITAL | Age: 72
End: 2022-11-02

## 2022-11-02 ENCOUNTER — HOSPITAL ENCOUNTER (OUTPATIENT)
Facility: HOSPITAL | Age: 72
Setting detail: HOSPITAL OUTPATIENT SURGERY
Discharge: HOME | End: 2022-11-02
Attending: INTERNAL MEDICINE | Admitting: INTERNAL MEDICINE

## 2022-11-02 VITALS
WEIGHT: 142.86 LBS | DIASTOLIC BLOOD PRESSURE: 60 MMHG | TEMPERATURE: 97.4 F | SYSTOLIC BLOOD PRESSURE: 105 MMHG | HEIGHT: 68 IN | HEART RATE: 58 BPM | OXYGEN SATURATION: 95 % | RESPIRATION RATE: 14 BRPM | BODY MASS INDEX: 21.65 KG/M2

## 2022-11-02 DIAGNOSIS — K21.9 GASTROESOPHAGEAL REFLUX DISEASE WITHOUT ESOPHAGITIS: ICD-10-CM

## 2022-11-02 DIAGNOSIS — K62.5 RECTAL BLEEDING: ICD-10-CM

## 2022-11-02 PROCEDURE — 25010000002 HYDROMORPHONE 1 MG/ML SOLUTION: Performed by: INTERNAL MEDICINE

## 2022-11-02 PROCEDURE — 88305 TISSUE EXAM BY PATHOLOGIST: CPT | Performed by: INTERNAL MEDICINE

## 2022-11-02 PROCEDURE — 43239 EGD BIOPSY SINGLE/MULTIPLE: CPT | Performed by: INTERNAL MEDICINE

## 2022-11-02 PROCEDURE — 74176 CT ABD & PELVIS W/O CONTRAST: CPT

## 2022-11-02 PROCEDURE — 25010000002 PROPOFOL 10 MG/ML EMULSION: Performed by: NURSE ANESTHETIST, CERTIFIED REGISTERED

## 2022-11-02 PROCEDURE — 45385 COLONOSCOPY W/LESION REMOVAL: CPT | Performed by: INTERNAL MEDICINE

## 2022-11-02 RX ORDER — PROPOFOL 10 MG/ML
VIAL (ML) INTRAVENOUS AS NEEDED
Status: DISCONTINUED | OUTPATIENT
Start: 2022-11-02 | End: 2022-11-02 | Stop reason: SURG

## 2022-11-02 RX ORDER — SODIUM CHLORIDE, SODIUM LACTATE, POTASSIUM CHLORIDE, CALCIUM CHLORIDE 600; 310; 30; 20 MG/100ML; MG/100ML; MG/100ML; MG/100ML
30 INJECTION, SOLUTION INTRAVENOUS CONTINUOUS
Status: DISCONTINUED | OUTPATIENT
Start: 2022-11-02 | End: 2022-11-02 | Stop reason: HOSPADM

## 2022-11-02 RX ORDER — LIDOCAINE HYDROCHLORIDE 20 MG/ML
INJECTION, SOLUTION EPIDURAL; INFILTRATION; INTRACAUDAL; PERINEURAL AS NEEDED
Status: DISCONTINUED | OUTPATIENT
Start: 2022-11-02 | End: 2022-11-02 | Stop reason: SURG

## 2022-11-02 RX ADMIN — PROPOFOL 175 MCG/KG/MIN: 10 INJECTION, EMULSION INTRAVENOUS at 09:46

## 2022-11-02 RX ADMIN — HYDROMORPHONE HYDROCHLORIDE 0.5 MG: 1 INJECTION, SOLUTION INTRAMUSCULAR; INTRAVENOUS; SUBCUTANEOUS at 12:46

## 2022-11-02 RX ADMIN — PROPOFOL 50 MG: 10 INJECTION, EMULSION INTRAVENOUS at 09:46

## 2022-11-02 RX ADMIN — SODIUM CHLORIDE, POTASSIUM CHLORIDE, SODIUM LACTATE AND CALCIUM CHLORIDE: 600; 310; 30; 20 INJECTION, SOLUTION INTRAVENOUS at 09:44

## 2022-11-02 RX ADMIN — LIDOCAINE HYDROCHLORIDE 100 MG: 20 INJECTION, SOLUTION EPIDURAL; INFILTRATION; INTRACAUDAL; PERINEURAL at 09:46

## 2022-11-02 NOTE — DISCHARGE INSTRUCTIONS
You may resume normal activity on 11/2/22 at 1010.  Stay away from greasy, gas producing, and spicy foods today.  Start off with bland foods.  Be near a bathroom when you eat, the bowel prep might still be working it's way through your body.  It is normal to find a little blood on your toilet paper.  IF YOU ARE PASSING BLOOD CLOTS OR FILLING THE TOILET, CALL 911 AND/OR GO TO YOUR NEAREST ER.    NO DRIVING, SIGNING LEGAL DOCUMENTS, OR ALCOHOL CONSUMPTION FOR 24 HOURS.

## 2022-11-02 NOTE — NURSING NOTE
Keeley Rowley notified me that she charted on this patients chart earlier and if there is anything from her that it is inaccurate. PARISA RN

## 2022-11-02 NOTE — ANESTHESIA PREPROCEDURE EVALUATION
Anesthesia Evaluation     Patient summary reviewed and Nursing notes reviewed   no history of anesthetic complications:  NPO Solid Status: > 8 hours  NPO Liquid Status: > 2 hours           Airway   Mallampati: I  TM distance: >3 FB  Neck ROM: full  No difficulty expected  Dental      Pulmonary - normal exam    breath sounds clear to auscultation  (+) sleep apnea on CPAP,   Cardiovascular - negative cardio ROS and normal exam  Exercise tolerance: good (4-7 METS)    Rhythm: regular  Rate: normal        Neuro/Psych- negative ROS  GI/Hepatic/Renal/Endo    (+)   thyroid problem hypothyroidism    Musculoskeletal     Abdominal    Substance History      OB/GYN          Other   arthritis,      ROS/Med Hx Other: PAT Nursing Notes unavailable.                   Anesthesia Plan    ASA 2     general   total IV anesthesia    Anesthetic plan, risks, benefits, and alternatives have been provided, discussed and informed consent has been obtained with: patient.        CODE STATUS:

## 2022-11-02 NOTE — H&P
Pre Procedure History & Physical    Chief Complaint:   gerd  Rectal bleeding  constipation    Subjective     HPI:   As above    Past Medical History:   Past Medical History:   Diagnosis Date   • Anemia    • Arthritis Not sure   • Burning sensation of feet    • Cervical disc disorder    • CTS (carpal tunnel syndrome)    • Dissection of thoracic aorta    • Dry eyes, bilateral    • GERD (gastroesophageal reflux disease)    • Hiatal hernia Not sure   • Hypothyroidism, unspecified    • Lung nodule    • Osteoporosis    • Other specified disorders of white blood cells    • Sleep apnea     CPAP   • Solitary pulmonary nodule    • Thyroid disease    • Vitamin D deficiency        Past Surgical History:  Past Surgical History:   Procedure Laterality Date   • BREAST AUGMENTATION  2001   • CARPAL TUNNEL RELEASE  2006   • CHOLECYSTECTOMY  2005   • COLONOSCOPY  2018   • ENDOSCOPY  2016   • HEMORROIDECTOMY  1979   • HYSTERECTOMY  2010       Family History:  Family History   Problem Relation Age of Onset   • Heart failure Mother         Aorta dissection   • Colon cancer Brother    • Cancer Brother         Colon   • Malig Hyperthermia Neg Hx        Social History:   reports that she quit smoking about 41 years ago. Her smoking use included cigarettes. She has a 7.50 pack-year smoking history. She has never used smokeless tobacco. She reports that she does not drink alcohol and does not use drugs.    Medications:   Medications Prior to Admission   Medication Sig Dispense Refill Last Dose   • ascorbic acid (VITAMIN C) 250 MG tablet Take 500 mg by mouth Daily.      • calcium carbonate (Calcium 600) 600 MG tablet Take 1 tablet by mouth Daily.      • cholecalciferol (VITAMIN D3) 1000 UNITS tablet Take 1,000 Units by mouth 2 (Two) Times a Day.      • cycloSPORINE (RESTASIS) 0.05 % ophthalmic emulsion 1 drop 2 (Two) Times a Day.      • Denosumab (PROLIA SC) Inject  under the skin into the appropriate area as directed Every 6 (Six) Months.     "  • fluticasone (FLONASE) 50 MCG/ACT nasal spray INSTILL 2 SPRAYS IN EACH NOSTRIL EVERY DAY 16 g 3    • l-methylfolate-algae-B12-B6 3-90.314-2-35 MG capsule capsule Take 1 capsule by mouth Daily.      • levothyroxine (SYNTHROID, LEVOTHROID) 88 MCG tablet TAKE 1 TABLET BY MOUTH EVERY MORNING 90 tablet 3    • Multiple Vitamins-Calcium (One-A-Day Womens Formula) tablet Take  by mouth Daily.      • polyethylene glycol (MIRALAX) 17 g packet Daily.      • Respiratory Therapy Supplies (CareTouch CPAP Mask Wipes) misc Take As Directed.      • vitamin B-12 (CYANOCOBALAMIN) 1000 MCG tablet Take 1,000 mcg by mouth 1 (One) Time Per Week.      • Zinc Sulfate (ZINC 15 PO) Take  by mouth Daily.      • sodium-potassium-magnesium sulfates (Suprep Bowel Prep Kit) 17.5-3.13-1.6 GM/177ML solution oral solution Take 2 bottles by mouth Take As Directed. 177 mL 0        Allergies:  Patient has no known allergies.        Objective     Blood pressure 103/62, pulse 68, temperature 97.6 °F (36.4 °C), temperature source Temporal, resp. rate 18, height 171.5 cm (67.5\"), weight 64.8 kg (142 lb 13.7 oz), SpO2 100 %.    Physical Exam   Constitutional: Pt is oriented to person, place, and time and well-developed, well-nourished, and in no distress.   Mouth/Throat: Oropharynx is clear and moist.   Neck: Normal range of motion.   Cardiovascular: Normal rate, regular rhythm and normal heart sounds.    Pulmonary/Chest: Effort normal and breath sounds normal.   Abdominal: Soft. Nontender  Skin: Skin is warm and dry.   Psychiatric: Mood, memory, affect and judgment normal.     Assessment & Plan     Diagnosis:  gerd  Constipation  Rectal bleeding    Anticipated Surgical Procedure:  egd  colonoscopy    The risks, benefits, and alternatives of this procedure have been discussed with the patient or the responsible party- the patient understands and agrees to proceed.            "

## 2022-11-02 NOTE — NURSING NOTE
Error in charting. Wrong chart pulled up.  Documentation on wrong patient.  All documentation from this nurse is inaccurate.  Charge nurse made aware.  Primary RN made aware.    Keeley Rowley RN 10:19 EDT 11/2/2022

## 2022-11-02 NOTE — NURSING NOTE
Patient vitals are stable.  No complaints of pain or discomfort.  Pertinent information/education provided in discharge packet.  Family contacted and educated on discharge instructions and complications that could happen at home.  Patient and family verbalize understanding and are able to perform teach back.      Keeley Rowley RN 10:17 EDT 11/2/2022

## 2022-11-02 NOTE — ANESTHESIA POSTPROCEDURE EVALUATION
Patient: Agnieszka Nettles    Procedure Summary     Date: 11/02/22 Room / Location: [facility] ENDOSCOPY 2 / [facility] ENDOSCOPY    Anesthesia Start: 0944 Anesthesia Stop: 1105    Procedures:       ESOPHAGOGASTRODUODENOSCOPY      COLONOSCOPY Diagnosis:       Gastroesophageal reflux disease without esophagitis      Rectal bleeding      (Gastroesophageal reflux disease without esophagitis [K21.9])      (Rectal bleeding [K62.5])    Surgeons: Sanchez Keith MD Provider: Ignacio Lynch MD    Anesthesia Type: general ASA Status: 2          Anesthesia Type: general    Vitals  Vitals Value Taken Time   /60 11/02/22 1122   Temp 36.3 °C (97.4 °F) 11/02/22 1122   Pulse 58 11/02/22 1122   Resp 14 11/02/22 1122   SpO2 95 % 11/02/22 1122           Post Anesthesia Care and Evaluation    Patient location during evaluation: bedside  Patient participation: complete - patient participated  Level of consciousness: awake  Pain management: adequate    Airway patency: patent  Anesthetic complications: No anesthetic complications  PONV Status: none  Cardiovascular status: acceptable and stable  Respiratory status: acceptable  Hydration status: acceptable    Comments: An Anesthesiologist personally participated in the most demanding procedures (including induction and emergence if applicable) in the anesthesia plan, monitored the course of anesthesia administration at frequent intervals and remained physically present and available for immediate diagnosis and treatment of emergencies.

## 2022-11-03 LAB
CYTO UR: NORMAL
LAB AP CASE REPORT: NORMAL
LAB AP CLINICAL INFORMATION: NORMAL
PATH REPORT.FINAL DX SPEC: NORMAL
PATH REPORT.GROSS SPEC: NORMAL

## 2022-11-10 ENCOUNTER — APPOINTMENT (OUTPATIENT)
Dept: WOMENS IMAGING | Facility: HOSPITAL | Age: 72
End: 2022-11-10

## 2022-11-10 ENCOUNTER — HOSPITAL ENCOUNTER (OUTPATIENT)
Dept: OTHER | Facility: HOSPITAL | Age: 72
Discharge: HOME | End: 2022-11-10

## 2022-11-10 PROCEDURE — 77063 BREAST TOMOSYNTHESIS BI: CPT | Performed by: RADIOLOGY

## 2022-11-10 PROCEDURE — 77067 SCR MAMMO BI INCL CAD: CPT | Performed by: RADIOLOGY

## 2022-12-27 RX ORDER — LEVOTHYROXINE SODIUM 88 UG/1
88 TABLET ORAL EVERY MORNING
Qty: 90 TABLET | Refills: 3 | Status: SHIPPED | OUTPATIENT
Start: 2022-12-27

## 2023-01-31 ENCOUNTER — OFFICE VISIT (OUTPATIENT)
Dept: ORTHOPEDIC SURGERY | Facility: CLINIC | Age: 73
End: 2023-01-31
Payer: MEDICARE

## 2023-01-31 VITALS — RESPIRATION RATE: 16 BRPM | BODY MASS INDEX: 21.52 KG/M2 | TEMPERATURE: 96.5 F | WEIGHT: 142 LBS | HEIGHT: 68 IN

## 2023-01-31 DIAGNOSIS — M70.61 TROCHANTERIC BURSITIS OF RIGHT HIP: Primary | ICD-10-CM

## 2023-01-31 PROCEDURE — 99214 OFFICE O/P EST MOD 30 MIN: CPT | Performed by: ORTHOPAEDIC SURGERY

## 2023-01-31 PROCEDURE — 20610 DRAIN/INJ JOINT/BURSA W/O US: CPT | Performed by: ORTHOPAEDIC SURGERY

## 2023-01-31 RX ORDER — METHYLPREDNISOLONE ACETATE 80 MG/ML
80 INJECTION, SUSPENSION INTRA-ARTICULAR; INTRALESIONAL; INTRAMUSCULAR; SOFT TISSUE
Status: COMPLETED | OUTPATIENT
Start: 2023-01-31 | End: 2023-01-31

## 2023-01-31 RX ADMIN — METHYLPREDNISOLONE ACETATE 80 MG: 80 INJECTION, SUSPENSION INTRA-ARTICULAR; INTRALESIONAL; INTRAMUSCULAR; SOFT TISSUE at 15:15

## 2023-01-31 NOTE — PROGRESS NOTES
Patient: Agnieszka Nettles  YOB: 1950 72 y.o. female  Medical Record Number: [id number]    Chief Complaint:   Chief Complaint   Patient presents with   • Right Hip - Follow-up       History of Present Illness:Agnieszka Nettles is a 72 y.o. female who presents for follow-up of right lateral hip pain.  She had a previous injection many years ago for hip bursitis and her pain went away for years.  She had returned 5 years later of lateral right hip pain and had an injection a few months ago and unfortunately that injection has only helped for a few weeks.  Pain is now returned and it is fairly severe with any activity or when laying on her side.    Allergies: No Known Allergies    Medications:   Current Outpatient Medications   Medication Sig Dispense Refill   • ascorbic acid (VITAMIN C) 250 MG tablet Take 500 mg by mouth Daily.     • calcium carbonate (OS-LOKI) 600 MG tablet Take 1 tablet by mouth Daily.     • cholecalciferol (VITAMIN D3) 1000 UNITS tablet Take 1,000 Units by mouth 2 (Two) Times a Day.     • cycloSPORINE (RESTASIS) 0.05 % ophthalmic emulsion 1 drop 2 (Two) Times a Day.     • Denosumab (PROLIA SC) Inject  under the skin into the appropriate area as directed Every 6 (Six) Months.     • fluticasone (FLONASE) 50 MCG/ACT nasal spray INSTILL 2 SPRAYS IN EACH NOSTRIL EVERY DAY 16 g 3   • l-methylfolate-algae-B12-B6 3-90.314-2-35 MG capsule capsule Take 1 capsule by mouth Daily.     • levothyroxine (SYNTHROID, LEVOTHROID) 88 MCG tablet Take 1 tablet by mouth Every Morning. 90 tablet 3   • Multiple Vitamins-Calcium (One-A-Day Womens Formula) tablet Take  by mouth Daily.     • polyethylene glycol (MIRALAX) 17 g packet Daily.     • Respiratory Therapy Supplies (CareTouch CPAP Mask Wipes) misc Take As Directed.     • vitamin B-12 (CYANOCOBALAMIN) 1000 MCG tablet Take 1,000 mcg by mouth 1 (One) Time Per Week.     • Zinc Sulfate (ZINC 15 PO) Take  by mouth Daily.       No current facility-administered  "medications for this visit.         The following portions of the patient's history were reviewed and updated as appropriate: allergies, current medications, past family history, past medical history, past social history, past surgical history and problem list.    Review of Systems:   A 14 point review of systems was performed. All systems negative except pertinent positives/negative listed in HPI above    Physical Exam:   Vitals:    01/31/23 1333   Resp: 16   Temp: 96.5 °F (35.8 °C)   TempSrc: Temporal   Weight: 64.4 kg (142 lb)   Height: 171.5 cm (67.52\")   PainSc: 0-No pain       General: A and O x 3, ASA, NAD    SCLERA:    Normal    DENTITION:   Normal  Hip:  right    LEG ALIGNMENT:     Neutral   ,    equal leg lengths    GAIT:     Nonantalgic    SKIN:     No abnormality    RANGE OF MOTION:      Full without joint irritability    STRENGTH:     5 / 5    hip flexion and abduction    DISTAL PULSES:    Paplable    DISTAL SENSATION :   Intact    LYMPHATICS:     No   lymphadenopathy    OTHER:          - Negative Stinchfeld test      - Negative log roll      +Tenderness to palpation trochanteric bursa and for a few centimeters proximal to the trochanteric bursa      Radiology:       Assessment/Plan:  Right lateral hip pain.  She had a previous hip bursa injection which did help but only for a month or so.  Her pain is around the lateral trochanter and it just the proximal extent as well.  Differential diagnosis would be hip bursitis versus abductor tendinitis/tear.  I injected the hip bursa and abductor tendon insertion region as below.  If she fails to respond with a lasting relief of symptoms I would order an MRI for further evaluation of the abductor tendon tear and surrounding area.  She will call back if she is not better.  Large Joint Arthrocentesis: R greater trochanteric bursa  Date/Time: 1/31/2023 3:15 PM  Consent given by: patient  Site marked: site marked  Timeout: Immediately prior to procedure a time out " was called to verify the correct patient, procedure, equipment, support staff and site/side marked as required   Supporting Documentation  Indications: pain   Procedure Details  Location: hip - R greater trochanteric bursa  Preparation: Patient was prepped and draped in the usual sterile fashion  Needle size: 18 G  Approach: lateral  Medications administered: 80 mg methylPREDNISolone acetate 80 MG/ML; 2 mL lidocaine (cardiac)                Ahsan Villalobos MD  1/31/2023

## 2023-03-09 RX ORDER — FLUTICASONE PROPIONATE 50 MCG
2 SPRAY, SUSPENSION (ML) NASAL DAILY
Qty: 16 G | Refills: 3 | Status: SHIPPED | OUTPATIENT
Start: 2023-03-09

## 2023-04-12 ENCOUNTER — LAB (OUTPATIENT)
Dept: LAB | Facility: HOSPITAL | Age: 73
End: 2023-04-12
Payer: MEDICARE

## 2023-04-12 DIAGNOSIS — D64.9 ANEMIA, UNSPECIFIED TYPE: ICD-10-CM

## 2023-04-12 DIAGNOSIS — E55.9 VITAMIN D DEFICIENCY: ICD-10-CM

## 2023-04-12 DIAGNOSIS — K64.9 HEMORRHOIDS, UNSPECIFIED HEMORRHOID TYPE: ICD-10-CM

## 2023-04-12 DIAGNOSIS — R91.1 SOLITARY PULMONARY NODULE: ICD-10-CM

## 2023-04-12 DIAGNOSIS — Z00.00 PHYSICAL EXAM, ANNUAL: ICD-10-CM

## 2023-04-12 DIAGNOSIS — D72.819 CHRONIC LEUKOPENIA: ICD-10-CM

## 2023-04-12 DIAGNOSIS — E06.3 HYPOTHYROIDISM DUE TO HASHIMOTO'S THYROIDITIS: ICD-10-CM

## 2023-04-12 DIAGNOSIS — M81.0 AGE-RELATED OSTEOPOROSIS WITHOUT CURRENT PATHOLOGICAL FRACTURE: ICD-10-CM

## 2023-04-12 DIAGNOSIS — K21.9 GASTROESOPHAGEAL REFLUX DISEASE WITHOUT ESOPHAGITIS: ICD-10-CM

## 2023-04-12 DIAGNOSIS — E03.8 HYPOTHYROIDISM DUE TO HASHIMOTO'S THYROIDITIS: ICD-10-CM

## 2023-04-12 DIAGNOSIS — R53.83 FATIGUE, UNSPECIFIED TYPE: ICD-10-CM

## 2023-04-12 DIAGNOSIS — R91.8 PULMONARY NODULES/LESIONS, MULTIPLE: ICD-10-CM

## 2023-04-12 LAB
ALBUMIN SERPL-MCNC: 4.5 G/DL (ref 3.5–5.2)
ALBUMIN/GLOB SERPL: 1.7 G/DL
ALP SERPL-CCNC: 49 U/L (ref 39–117)
ALT SERPL W P-5'-P-CCNC: 22 U/L (ref 1–33)
ANION GAP SERPL CALCULATED.3IONS-SCNC: 9 MMOL/L (ref 5–15)
AST SERPL-CCNC: 27 U/L (ref 1–32)
BASOPHILS # BLD AUTO: 0.04 10*3/MM3 (ref 0–0.2)
BASOPHILS NFR BLD AUTO: 0.9 % (ref 0–1.5)
BILIRUB SERPL-MCNC: 0.6 MG/DL (ref 0–1.2)
BUN SERPL-MCNC: 14 MG/DL (ref 8–23)
BUN/CREAT SERPL: 17.5 (ref 7–25)
CALCIUM SPEC-SCNC: 9.8 MG/DL (ref 8.6–10.5)
CHLORIDE SERPL-SCNC: 102 MMOL/L (ref 98–107)
CHOLEST SERPL-MCNC: 191 MG/DL (ref 0–200)
CO2 SERPL-SCNC: 30 MMOL/L (ref 22–29)
CREAT SERPL-MCNC: 0.8 MG/DL (ref 0.57–1)
DEPRECATED RDW RBC AUTO: 42.1 FL (ref 37–54)
EGFRCR SERPLBLD CKD-EPI 2021: 77.4 ML/MIN/1.73
EOSINOPHIL # BLD AUTO: 0.12 10*3/MM3 (ref 0–0.4)
EOSINOPHIL NFR BLD AUTO: 2.6 % (ref 0.3–6.2)
ERYTHROCYTE [DISTWIDTH] IN BLOOD BY AUTOMATED COUNT: 12.7 % (ref 12.3–15.4)
GLOBULIN UR ELPH-MCNC: 2.7 GM/DL
GLUCOSE SERPL-MCNC: 98 MG/DL (ref 65–99)
HCT VFR BLD AUTO: 39.3 % (ref 34–46.6)
HDLC SERPL-MCNC: 91 MG/DL (ref 40–60)
HGB BLD-MCNC: 13 G/DL (ref 12–15.9)
IMM GRANULOCYTES # BLD AUTO: 0.01 10*3/MM3 (ref 0–0.05)
IMM GRANULOCYTES NFR BLD AUTO: 0.2 % (ref 0–0.5)
LDLC SERPL CALC-MCNC: 87 MG/DL (ref 0–100)
LDLC/HDLC SERPL: 0.94 {RATIO}
LYMPHOCYTES # BLD AUTO: 1.46 10*3/MM3 (ref 0.7–3.1)
LYMPHOCYTES NFR BLD AUTO: 32.2 % (ref 19.6–45.3)
MCH RBC QN AUTO: 29.8 PG (ref 26.6–33)
MCHC RBC AUTO-ENTMCNC: 33.1 G/DL (ref 31.5–35.7)
MCV RBC AUTO: 90.1 FL (ref 79–97)
MONOCYTES # BLD AUTO: 0.4 10*3/MM3 (ref 0.1–0.9)
MONOCYTES NFR BLD AUTO: 8.8 % (ref 5–12)
NEUTROPHILS NFR BLD AUTO: 2.5 10*3/MM3 (ref 1.7–7)
NEUTROPHILS NFR BLD AUTO: 55.3 % (ref 42.7–76)
NRBC BLD AUTO-RTO: 0 /100 WBC (ref 0–0.2)
PLATELET # BLD AUTO: 177 10*3/MM3 (ref 140–450)
PMV BLD AUTO: 11.3 FL (ref 6–12)
POTASSIUM SERPL-SCNC: 4.2 MMOL/L (ref 3.5–5.2)
PROT SERPL-MCNC: 7.2 G/DL (ref 6–8.5)
RBC # BLD AUTO: 4.36 10*6/MM3 (ref 3.77–5.28)
SODIUM SERPL-SCNC: 141 MMOL/L (ref 136–145)
T4 FREE SERPL-MCNC: 1.34 NG/DL (ref 0.93–1.7)
TRIGL SERPL-MCNC: 71 MG/DL (ref 0–150)
TSH SERPL DL<=0.05 MIU/L-ACNC: 4.24 UIU/ML (ref 0.27–4.2)
VLDLC SERPL-MCNC: 13 MG/DL (ref 5–40)
WBC NRBC COR # BLD: 4.53 10*3/MM3 (ref 3.4–10.8)

## 2023-04-12 PROCEDURE — 84443 ASSAY THYROID STIM HORMONE: CPT

## 2023-04-12 PROCEDURE — 80053 COMPREHEN METABOLIC PANEL: CPT

## 2023-04-12 PROCEDURE — 84439 ASSAY OF FREE THYROXINE: CPT

## 2023-04-12 PROCEDURE — 85025 COMPLETE CBC W/AUTO DIFF WBC: CPT

## 2023-04-12 PROCEDURE — 80061 LIPID PANEL: CPT

## 2023-04-12 PROCEDURE — 36415 COLL VENOUS BLD VENIPUNCTURE: CPT

## 2023-04-20 ENCOUNTER — OFFICE VISIT (OUTPATIENT)
Dept: INTERNAL MEDICINE | Facility: CLINIC | Age: 73
End: 2023-04-20
Payer: MEDICARE

## 2023-04-20 VITALS
OXYGEN SATURATION: 98 % | SYSTOLIC BLOOD PRESSURE: 123 MMHG | BODY MASS INDEX: 21.86 KG/M2 | WEIGHT: 144.2 LBS | DIASTOLIC BLOOD PRESSURE: 79 MMHG | HEIGHT: 68 IN | TEMPERATURE: 98 F | HEART RATE: 70 BPM

## 2023-04-20 DIAGNOSIS — E06.3 HYPOTHYROIDISM DUE TO HASHIMOTO'S THYROIDITIS: Primary | ICD-10-CM

## 2023-04-20 DIAGNOSIS — D72.819 CHRONIC LEUKOPENIA: ICD-10-CM

## 2023-04-20 DIAGNOSIS — E03.8 HYPOTHYROIDISM DUE TO HASHIMOTO'S THYROIDITIS: Primary | ICD-10-CM

## 2023-04-20 DIAGNOSIS — K21.9 GASTROESOPHAGEAL REFLUX DISEASE WITHOUT ESOPHAGITIS: ICD-10-CM

## 2023-04-20 DIAGNOSIS — M81.0 AGE-RELATED OSTEOPOROSIS WITHOUT CURRENT PATHOLOGICAL FRACTURE: ICD-10-CM

## 2023-04-20 DIAGNOSIS — E55.9 VITAMIN D DEFICIENCY: ICD-10-CM

## 2023-04-20 DIAGNOSIS — R13.10 DYSPHAGIA, UNSPECIFIED TYPE: ICD-10-CM

## 2023-04-20 PROCEDURE — 99214 OFFICE O/P EST MOD 30 MIN: CPT | Performed by: INTERNAL MEDICINE

## 2023-04-20 RX ORDER — LEVOTHYROXINE SODIUM 0.1 MG/1
100 TABLET ORAL DAILY
Qty: 90 TABLET | Refills: 3 | Status: SHIPPED | OUTPATIENT
Start: 2023-04-20

## 2023-04-20 NOTE — PROGRESS NOTES
"Answers for HPI/ROS submitted by the patient on 4/13/2023  What is the primary reason for your visit?: Physical    CHIEF COMPLAINT/ HPI:  Hypothyroidism and Follow-up (Pt here for routine follow up)    Patient is here also with recent hip issues bursitis has had 2 cortisone shots from Dr. Ahsan Villalobos in [place],    Patient is here to follow-up with labs, thyroid issues, she had a colonoscopy endoscopy in November,      Objective   Vital Signs  Vitals:    04/20/23 1153   BP: 123/79   Pulse: 70   Temp: 98 °F (36.7 °C)   SpO2: 98%   Weight: 65.4 kg (144 lb 3.2 oz)   Height: 171.5 cm (67.52\")      Body mass index is 22.24 kg/m².  Review of Systems   Constitutional: Negative.    HENT: Negative.    Eyes: Negative.    Respiratory: Negative.    Cardiovascular: Negative.    Gastrointestinal: Negative.    Endocrine: Negative.    Genitourinary: Negative.    Musculoskeletal: Negative.  Positive for bursitis.   Allergic/Immunologic: Negative.    Neurological: Negative.    Hematological: Negative.    Psychiatric/Behavioral: Negative.       Physical Exam  Constitutional:       General: She is not in acute distress.     Appearance: Normal appearance.   HENT:      Head: Normocephalic.      Mouth/Throat:      Mouth: Mucous membranes are moist.   Eyes:      Conjunctiva/sclera: Conjunctivae normal.      Pupils: Pupils are equal, round, and reactive to light.   Cardiovascular:      Rate and Rhythm: Normal rate and regular rhythm.      Pulses: Normal pulses.      Heart sounds: Normal heart sounds.   Pulmonary:      Effort: Pulmonary effort is normal.      Breath sounds: Normal breath sounds.   Abdominal:      General: Abdomen is flat. Bowel sounds are normal.      Palpations: Abdomen is soft.   Musculoskeletal:         General: No swelling. Normal range of motion.      Cervical back: Neck supple.   Skin:     General: Skin is warm and dry.      Coloration: Skin is not jaundiced.   Neurological:      General: No focal deficit present.    "   Mental Status: She is alert and oriented to person, place, and time. Mental status is at baseline.   Psychiatric:         Mood and Affect: Mood normal.         Behavior: Behavior normal.         Thought Content: Thought content normal.         Judgment: Judgment normal.        Result Review :   No results found for: PROBNP, BNP  CMP        9/15/2022    13:38 3/20/2023    10:44 4/12/2023    07:38   CMP   Glucose   98     BUN   14     Creatinine   0.80     EGFR   77.4     Sodium   141     Potassium   4.2     Chloride   102     Calcium 10.5      10.1      9.8     Total Protein   7.2     Albumin   4.5     Globulin   2.7     Total Bilirubin   0.6     Alkaline Phosphatase   49     AST (SGOT)   27     ALT (SGPT)   22     Albumin/Globulin Ratio   1.7     BUN/Creatinine Ratio   17.5     Anion Gap   9.0         This result is from an external source.     CBC w/diff        5/6/2022    08:18 4/12/2023    07:38   CBC w/Diff   WBC 4.85   4.53     RBC 4.29   4.36     Hemoglobin 12.8   13.0     Hematocrit 38.1   39.3     MCV 88.8   90.1     MCH 29.8   29.8     MCHC 33.6   33.1     RDW 12.6   12.7     Platelets 195   177     Neutrophil Rel % 65.2   55.3     Immature Granulocyte Rel % 0.4   0.2     Lymphocyte Rel % 23.7   32.2     Monocyte Rel % 7.8   8.8     Eosinophil Rel % 2.1   2.6     Basophil Rel % 0.8   0.9        Lipid Panel        5/6/2022    08:18 4/12/2023    07:38   Lipid Panel   Total Cholesterol 184   191     Triglycerides 54   71     HDL Cholesterol 84   91     VLDL Cholesterol 11   13     LDL Cholesterol  89   87     LDL/HDL Ratio 1.06   0.94        Lab Results   Component Value Date    TSH 4.240 (H) 04/12/2023    TSH 3.100 05/06/2022    TSH 3.120 06/14/2021      Lab Results   Component Value Date    FREET4 1.34 04/12/2023    FREET4 1.39 05/06/2022    FREET4 1.16 06/14/2021      A1C Last 3 Results        8/15/2022    16:29   HGBA1C Last 3 Results   Hemoglobin A1C 5.70                         Visit Diagnoses:     ICD-10-CM ICD-9-CM   1. Hypothyroidism due to Hashimoto's thyroiditis  E03.8 244.8    E06.3 245.2   2. Chronic leukopenia  D72.819 288.50   3. Gastroesophageal reflux disease without esophagitis  K21.9 530.81   4. Vitamin D deficiency  E55.9 268.9   5. Dysphagia, unspecified type  R13.10 787.20   6. Age-related osteoporosis without current pathological fracture  M81.0 733.01       Assessment and Plan   Diagnoses and all orders for this visit:    1. Hypothyroidism due to Hashimoto's thyroiditis (Primary)  -     Hepatitis C antibody; Future  -     levothyroxine (Synthroid) 100 MCG tablet; Take 1 tablet by mouth Daily.  Dispense: 90 tablet; Refill: 3  -     TSH+Free T4; Future  -     Comprehensive Metabolic Panel; Future  -     CBC & Differential; Future    2. Chronic leukopenia  -     Hepatitis C antibody; Future  -     levothyroxine (Synthroid) 100 MCG tablet; Take 1 tablet by mouth Daily.  Dispense: 90 tablet; Refill: 3  -     TSH+Free T4; Future  -     Comprehensive Metabolic Panel; Future  -     CBC & Differential; Future    3. Gastroesophageal reflux disease without esophagitis  -     Hepatitis C antibody; Future  -     levothyroxine (Synthroid) 100 MCG tablet; Take 1 tablet by mouth Daily.  Dispense: 90 tablet; Refill: 3  -     TSH+Free T4; Future  -     Comprehensive Metabolic Panel; Future  -     CBC & Differential; Future    4. Vitamin D deficiency  -     Hepatitis C antibody; Future  -     levothyroxine (Synthroid) 100 MCG tablet; Take 1 tablet by mouth Daily.  Dispense: 90 tablet; Refill: 3  -     TSH+Free T4; Future  -     Comprehensive Metabolic Panel; Future  -     CBC & Differential; Future    5. Dysphagia, unspecified type  -     Hepatitis C antibody; Future  -     levothyroxine (Synthroid) 100 MCG tablet; Take 1 tablet by mouth Daily.  Dispense: 90 tablet; Refill: 3  -     TSH+Free T4; Future  -     Comprehensive Metabolic Panel; Future  -     CBC & Differential; Future    6. Age-related osteoporosis  without current pathological fracture  -     Hepatitis C antibody; Future  -     levothyroxine (Synthroid) 100 MCG tablet; Take 1 tablet by mouth Daily.  Dispense: 90 tablet; Refill: 3  -     TSH+Free T4; Future  -     Comprehensive Metabolic Panel; Future  -     CBC & Differential; Future        BMI is within normal parameters. No other follow-up for BMI required.     Constipation issues, takes MiraLAX twice a week and Colace daily,    CT scan abdomen and pelvis November 2, 2022, this was due to increased pain after her colonoscopy endoscopy and was done to follow and rule out perforation free air etc. ------------showed limited study due to lack of IV and oral contrast no evidence of free air or small amount of perihepatic fluid perisplenic fluid and small amount of fluid within the pelvis findings nonspecific, no other significant findings    DISCUSSED CT CHEST /ECHO WITH MOTHER HAVING SEVERE AORTIC DISSECITON ? AUG 2020 ---CT scan of the chest shows some noncalcified pulmonary nodules right upper lung, no evidence of aortic dissection, December 15, 2020 ---No changes from the previous CT scan in September 2020, ---- 10 years of smoking plus secondhand smoke for a longer period of time, quit smoking 1981.---[facility] in [place], PET SCAN IN DEC 2020 PER DR CHARLES , --CT scan of the chest, December 7, 2021 showed stable postinflammatory fibronodular changes in the lung apices no new suspicious abnormalities no further follow-up was recommended, patient does have a follow-up with Dr. Charles--August 2022,    dyspagia, ? globus, sensation, HAS SEEN DR BECKHAM, AND dr silva TIMES 3 in [place], THRYOID NODULE, agree with taking proton pump inhibitor seems to be helping some, previous EGD November 2, 2022 with Dr. Stacy as below    FATIGUE, --DID SEE DR BRITTANI LONG AND DR MOULTON----NEG SJORGENS, POSTIVE HASHIMOTOS ABS, O/W NEG     RAYSHAWN, continues on cpap june 2017, using Nasonex,--     HYPOTHRYOIDISM,-------  continues SYNTHROID 0.088 MG QD,, slightly under replaced April 12, 2023 will increase dose to 100 mcg daily    GERD-- Back on Prilosec every other day,, EGD November 2, 2022 showed no significant findings dr jimenez,    OSTEOPORSIS BY BONE DENSITY 2015.  And again April 2022 --- continues ON PROLIA PER [facility] IN [place]    HEMORRHOIDS, CHOLEY, CARPAL TUNNEL, BREAST AUGMENTATION 2001  VIT D DEF.     C SCOPE DR BEGUM, 2013, 2018, November 2, 2022 dr jimenez, 3 mm sessile polyp transverse colon otherwise unremarkable,    MAMMO PER GYN IN [place]            Follow Up   No follow-ups on file.  Patient was given instructions and counseling regarding her condition or for health maintenance advice. Please see specific information pulled into the AVS if appropriate.

## 2023-05-17 ENCOUNTER — TELEPHONE (OUTPATIENT)
Dept: SLEEP MEDICINE | Facility: HOSPITAL | Age: 73
End: 2023-05-17
Payer: MEDICARE

## 2023-05-17 ENCOUNTER — TELEPHONE (OUTPATIENT)
Dept: INTERNAL MEDICINE | Facility: CLINIC | Age: 73
End: 2023-05-17
Payer: MEDICARE

## 2023-05-17 DIAGNOSIS — M25.559 HIP PAIN, UNSPECIFIED LATERALITY: Primary | ICD-10-CM

## 2023-05-17 NOTE — TELEPHONE ENCOUNTER
Caller: Agnieszka Nettles    Relationship: Self    Best call back number: [phone]    What is the medical concern/diagnosis: HIP PAIN     What specialty or service is being requested: PHYSICAL THERAPY

## 2023-06-05 ENCOUNTER — TELEPHONE (OUTPATIENT)
Dept: SLEEP MEDICINE | Facility: HOSPITAL | Age: 73
End: 2023-06-05
Payer: MEDICARE

## 2023-06-05 NOTE — TELEPHONE ENCOUNTER
Pt called today regarding getting a travel CPAP machine.  She will need order sent to the company she goes with.  She will let me know if I can help her any when she actually purchases a machine.

## 2023-07-28 ENCOUNTER — TELEPHONE (OUTPATIENT)
Dept: SLEEP MEDICINE | Facility: HOSPITAL | Age: 73
End: 2023-07-28
Payer: MEDICARE

## 2023-07-28 NOTE — TELEPHONE ENCOUNTER
Pt called today regarding the new travel machine she has purchased.  Pt states air is too strong at the current pressure and would like for the pressure to be tried at a 10 instead of the auto-pressures of 4-14.  I called [facility] who suggested [employer] stiven so that we are able to log in and see pts download and show to dr for the further changes.

## 2023-10-10 RX ORDER — FLUTICASONE PROPIONATE 50 MCG
2 SPRAY, SUSPENSION (ML) NASAL DAILY
Qty: 16 G | Refills: 3 | Status: SHIPPED | OUTPATIENT
Start: 2023-10-10

## 2023-10-26 ENCOUNTER — OFFICE VISIT (OUTPATIENT)
Dept: SLEEP MEDICINE | Facility: HOSPITAL | Age: 73
End: 2023-10-26
Payer: MEDICARE

## 2023-10-26 VITALS
SYSTOLIC BLOOD PRESSURE: 93 MMHG | HEIGHT: 68 IN | WEIGHT: 144.9 LBS | DIASTOLIC BLOOD PRESSURE: 76 MMHG | HEART RATE: 77 BPM | OXYGEN SATURATION: 98 % | BODY MASS INDEX: 21.96 KG/M2

## 2023-10-26 DIAGNOSIS — G47.33 OSA (OBSTRUCTIVE SLEEP APNEA): Primary | ICD-10-CM

## 2023-10-26 PROCEDURE — G0463 HOSPITAL OUTPT CLINIC VISIT: HCPCS

## 2023-10-26 NOTE — PROGRESS NOTES
"      [facility]         Dr Rubi Merrill  [unfilled]  Patient Care Team:  Joaquin Howe MD as PCP - General (Internal Medicine)    Chief Complaint:initial sleep study showed AHI 10.7 events per hour     Interval History: Patient of annual compliance visit.  Currently set up on CPAP 9 cm.  Compliance today 80+ percent with AHI leak within normal limits.  She also uses a travel CPAP.  Feels rested and benefiting from CPAP.  Goes to bed 10 gets up 5 gets about 6+ hours of sleep more rested with CPAP.  No tobacco no alcohol or caffeine abuse.  Currently has a nasal mask that fits well.  Supplies been adequate.  [place] 4 out of 24 within normal limits review of system positive for dry mouth acid reflux and abdominal bloating.    REVIEW OF SYSTEMS:   CARDIOVASCULAR: No chest pain, chest pressure or chest discomfort. No palpitations or edema.   RESPIRATORY: No shortness of breath, cough or sputum.   GASTROINTESTINAL: No anorexia, nausea, vomiting or diarrhea. No abdominal pain or blood.   HEMATOLOGIC: No bleeding or bruising.     Ventilator/Non-Invasive Ventilation Settings (From admission, onward)      None              Vital Signs  Heart Rate:  [77] 77  BP: (93)/(76) 93/76  [unfilled]  Flowsheet Rows      Flowsheet Row First Filed Value   Admission Height 172.7 cm (67.99\") Documented at 10/26/2023 1100   Admission Weight 65.7 kg (144 lb 14.4 oz) Documented at 10/26/2023 1100            Physical Exam:  Patient is examined using the personal protective equipment as per guidelines from infection control for this particular patient as enacted.  Hand hygiene was performed before and after patient interaction.   General Appearance:    Alert, cooperative, in no acute distress.  Following simple commands  ENT Mallampati between 3 and 4 no nasal congestion  Neck midline trachea, no thyromegaly   Lungs:     Clear to auscultation, respirations regular, even and                  unlabored    Heart:    Regular " rhythm and normal rate, normal S1 and S2, no            murmur, no gallop, no rub, no click   Chest Wall:    No abnormalities observed   Abdomen:     Normal bowel sounds, no masses, no organomegaly, soft        nontender, nondistended, no guarding, no rebound                tenderness   Extremities:   Moves all extremities well, no edema, no cyanosis, no             redness  CNS no focal neurological deficits normal sensory exam  Skin no rashes no nodules  Musculoskeletal no cyanosis no clubbing normal range of motion     Results Review:                                          I reviewed the patient's new clinical results.  I personally viewed and interpreted the patient's chest x-ray.        Medication Review:       No current facility-administered medications for this visit.      ASSESSMENT:   Obstructive sleep apnea.    2.  Gastroesophageal reflux disease.    3.  Allergic rhinitis.    4.  Hypothyroidism.     PLAN:  Reviewed compliance download with the patient  Compliance AHI leak look excellent.  She is using a travel CPAP which works well for her  Sleep hygiene measures  Treatment of underlying comorbidities  Treatment of allergies with nasal steroids  Follow-up in 1 year      Rubi Merrill MD  10/26/23  13:16 EDT

## 2023-11-13 ENCOUNTER — APPOINTMENT (OUTPATIENT)
Dept: WOMENS IMAGING | Facility: HOSPITAL | Age: 73
End: 2023-11-13
Payer: MEDICARE

## 2023-11-13 ENCOUNTER — HOSPITAL ENCOUNTER (OUTPATIENT)
Dept: OTHER | Facility: HOSPITAL | Age: 73
Discharge: HOME | End: 2023-11-13

## 2023-11-13 PROCEDURE — 77067 SCR MAMMO BI INCL CAD: CPT | Performed by: RADIOLOGY

## 2023-11-13 PROCEDURE — 77063 BREAST TOMOSYNTHESIS BI: CPT | Performed by: RADIOLOGY

## 2024-01-29 ENCOUNTER — TELEPHONE (OUTPATIENT)
Dept: SLEEP MEDICINE | Facility: HOSPITAL | Age: 74
End: 2024-01-29
Payer: MEDICARE

## 2024-01-29 NOTE — TELEPHONE ENCOUNTER
Pt called today to say that [facility] no longer accepted her insurance.  She would like her stuff  to go to [facility].    All reports were printed and sent to [facility] by fax.

## 2024-04-18 ENCOUNTER — OFFICE VISIT (OUTPATIENT)
Dept: SLEEP MEDICINE | Facility: HOSPITAL | Age: 74
End: 2024-04-18
Payer: MEDICARE

## 2024-04-18 VITALS
BODY MASS INDEX: 21.82 KG/M2 | WEIGHT: 144 LBS | SYSTOLIC BLOOD PRESSURE: 116 MMHG | HEIGHT: 68 IN | DIASTOLIC BLOOD PRESSURE: 68 MMHG | HEART RATE: 74 BPM | OXYGEN SATURATION: 100 %

## 2024-04-18 DIAGNOSIS — G47.33 OSA (OBSTRUCTIVE SLEEP APNEA): Primary | ICD-10-CM

## 2024-04-18 PROCEDURE — G0463 HOSPITAL OUTPT CLINIC VISIT: HCPCS

## 2024-04-18 NOTE — PROGRESS NOTES
"[facility]         Dr Rubi Merrill  [unfilled]  Patient Care Team:  Peace Torres MD as PCP - General (Plastic Surgery)  Lisa So MD as Consulting Physician (Obstetrics and Gynecology)    Chief Complaint:initial sleep study showed AHI 10.7 events per hour     Interval History: Patient here for compliance visit.  As you recall she is currently using her air mini travel unit.  Compliance today 73% average daily use 5 hours 42 minutes.  AHI within normal limits leak within normal limits.  She is currently set up on auto CPAP 8 to 9 cm.  She feels rested with the CPAP.  Request new auto CPAP.  Currently goes to bed 1030 gets up between 4 AM and 6 AM and feels more rested with CPAP.  No tobacco no alcohol no caffeine abuse.  Currently has a nasal mass that fits well.  Supplies been adequate.  [place] 4 out of 24 within normal limits.  Occasional dry mouth reported.    REVIEW OF SYSTEMS:   CARDIOVASCULAR: No chest pain, chest pressure or chest discomfort. No palpitations or edema.   RESPIRATORY: No shortness of breath, cough or sputum.   GASTROINTESTINAL: No anorexia, nausea, vomiting or diarrhea. No abdominal pain or blood.   HEMATOLOGIC: No bleeding or bruising.     Ventilator/Non-Invasive Ventilation Settings (From admission, onward)      None              Vital Signs  Heart Rate:  [74] 74  BP: (116)/(68) 116/68  [unfilled]  Flowsheet Rows      Flowsheet Row First Filed Value   Admission Height 172.7 cm (67.99\") Documented at 04/18/2024 0900   Admission Weight 65.3 kg (144 lb) Documented at 04/18/2024 0900            Physical Exam:  Patient is examined using the personal protective equipment as per guidelines from infection control for this particular patient as enacted.  Hand hygiene was performed before and after patient interaction.   General Appearance:    Alert, cooperative, in no acute distress.  Following simple commands  ENT Mallampati between 3 and 4 no nasal " congestion  Neck midline trachea, no thyromegaly   Lungs:     Clear to auscultation, respirations regular, even and                  unlabored    Heart:    Regular rhythm and normal rate, normal S1 and S2, no            murmur, no gallop, no rub, no click   Chest Wall:    No abnormalities observed   Abdomen:     Normal bowel sounds, no masses, no organomegaly, soft        nontender, nondistended, no guarding, no rebound                tenderness   Extremities:   Moves all extremities well, no edema, no cyanosis, no             redness  CNS no focal neurological deficits normal sensory exam  Skin no rashes no nodules  Musculoskeletal no cyanosis no clubbing normal range of motion     Results Review:                                          I reviewed the patient's new clinical results.  I personally viewed and interpreted the patient's chest x-ray.        Medication Review:       No current facility-administered medications for this visit.      ASSESSMENT:   Obstructive sleep apnea.    2.  Gastroesophageal reflux disease.    3.  Allergic rhinitis.    4.  Hypothyroidism.     PLAN:  Reviewed compliance download with the patient  Compliance AHI leak look pretty decent  I will order new ResMed CPAP since her actual home CPAP was filled up on her recall and currently more than 5 years old.  Will set up a ResMed CPAP 9 cm  Sleep hygiene measures  Treatment overnight comorbidities  Treatment of allergies with nasal steroids and antihistamine  We will follow-up in 3 months to review compliance download      Rubi Merrill MD  04/18/24  10:16 EDT

## 2024-04-24 DIAGNOSIS — E06.3 HYPOTHYROIDISM DUE TO HASHIMOTO'S THYROIDITIS: ICD-10-CM

## 2024-04-24 DIAGNOSIS — E03.8 HYPOTHYROIDISM DUE TO HASHIMOTO'S THYROIDITIS: ICD-10-CM

## 2024-04-24 DIAGNOSIS — K21.9 GASTROESOPHAGEAL REFLUX DISEASE WITHOUT ESOPHAGITIS: ICD-10-CM

## 2024-04-24 DIAGNOSIS — D72.819 CHRONIC LEUKOPENIA: ICD-10-CM

## 2024-04-24 DIAGNOSIS — M81.0 AGE-RELATED OSTEOPOROSIS WITHOUT CURRENT PATHOLOGICAL FRACTURE: ICD-10-CM

## 2024-04-24 DIAGNOSIS — E55.9 VITAMIN D DEFICIENCY: ICD-10-CM

## 2024-04-24 DIAGNOSIS — R13.10 DYSPHAGIA, UNSPECIFIED TYPE: ICD-10-CM

## 2024-04-24 RX ORDER — LEVOTHYROXINE SODIUM 0.1 MG/1
100 TABLET ORAL DAILY
Qty: 90 TABLET | Refills: 3 | Status: SHIPPED | OUTPATIENT
Start: 2024-04-24

## 2024-05-29 ENCOUNTER — TRANSCRIBE ORDERS (OUTPATIENT)
Dept: ADMINISTRATIVE | Facility: HOSPITAL | Age: 74
End: 2024-05-29
Payer: MEDICARE

## 2024-05-29 DIAGNOSIS — Z12.31 BREAST CANCER SCREENING BY MAMMOGRAM: ICD-10-CM

## 2024-05-29 DIAGNOSIS — N95.9 POST MENOPAUSAL PROBLEMS: Primary | ICD-10-CM

## 2024-08-22 ENCOUNTER — OFFICE VISIT (OUTPATIENT)
Dept: SLEEP MEDICINE | Facility: HOSPITAL | Age: 74
End: 2024-08-22
Payer: MEDICARE

## 2024-08-22 VITALS
WEIGHT: 140 LBS | SYSTOLIC BLOOD PRESSURE: 97 MMHG | HEIGHT: 68 IN | HEART RATE: 71 BPM | BODY MASS INDEX: 21.22 KG/M2 | OXYGEN SATURATION: 100 % | DIASTOLIC BLOOD PRESSURE: 59 MMHG

## 2024-08-22 DIAGNOSIS — G47.33 OSA (OBSTRUCTIVE SLEEP APNEA): Primary | ICD-10-CM

## 2024-08-22 PROCEDURE — G0463 HOSPITAL OUTPT CLINIC VISIT: HCPCS

## 2024-08-22 RX ORDER — LEVOTHYROXINE SODIUM 100 UG/1
1 CAPSULE ORAL DAILY
COMMUNITY
Start: 2024-07-03

## 2024-08-22 RX ORDER — LINACLOTIDE 72 UG/1
CAPSULE, GELATIN COATED ORAL
COMMUNITY
Start: 2024-04-15

## 2024-08-22 NOTE — PROGRESS NOTES
"      [facility]         Dr Rubi Merrill  [unfilled]  Patient Care Team:  Romy Samuel APRN as PCP - General (Nurse Practitioner)  Lisa So MD as Consulting Physician (Obstetrics and Gynecology)    Chief Complaint:initial sleep study showed AHI 10.7 events per hour     Interval History: Patient here for annual compliance visit.  Currently on CPAP 9 cm.  Compliance 93% average daily use 6 hours 40 minutes.  AHI and leak within normal limits.  Goes to bed 1030 gets up 530 gets about 6+ hours of sleep more rested.  Occasional dry mouth reported.  No tobacco no alcohol no caffeine abuse.  Currently has a nasal mask that fits well.  Supplies been adequate.  [place] 3 out of 24 within normal limits.    REVIEW OF SYSTEMS:   CARDIOVASCULAR: No chest pain, chest pressure or chest discomfort. No palpitations or edema.   RESPIRATORY: No shortness of breath, cough or sputum.   GASTROINTESTINAL: No anorexia, nausea, vomiting or diarrhea. No abdominal pain or blood.   HEMATOLOGIC: No bleeding or bruising.   Positive for nasal congestion dry mouth postnasal drainage acid reflux abdominal bloating  Ventilator/Non-Invasive Ventilation Settings (From admission, onward)      None              Vital Signs  Heart Rate:  [71] 71  BP: (97)/(59) 97/59  [unfilled]  Flowsheet Rows      Flowsheet Row First Filed Value   Admission Height 172.7 cm (67.99\") Documented at 08/22/2024 0800   Admission Weight 63.5 kg (140 lb) Documented at 08/22/2024 0800            Physical Exam:  Patient is examined using the personal protective equipment as per guidelines from infection control for this particular patient as enacted.  Hand hygiene was performed before and after patient interaction.   General Appearance:    Alert, cooperative, in no acute distress.  Following simple commands  ENT Mallampati between 3 and 4 no nasal congestion  Neck midline trachea, no thyromegaly   Lungs:     Clear to auscultation, respirations regular, " even and                  unlabored    Heart:    Regular rhythm and normal rate, normal S1 and S2, no            murmur, no gallop, no rub, no click   Chest Wall:    No abnormalities observed   Abdomen:     Normal bowel sounds, no masses, no organomegaly, soft        nontender, nondistended, no guarding, no rebound                tenderness   Extremities:   Moves all extremities well, no edema, no cyanosis, no             redness  CNS no focal neurological deficits normal sensory exam  Skin no rashes no nodules  Musculoskeletal no cyanosis no clubbing normal range of motion     Results Review:        Results from last 7 days   Lab Units 08/16/24  1116   CALCIUM mg/dL 10.5*                                   I reviewed the patient's new clinical results.  I personally viewed and interpreted the patient's chest x-ray.        Medication Review:       No current facility-administered medications for this visit.      ASSESSMENT:   Obstructive sleep apnea.    2.  Gastroesophageal reflux disease.    3.  Allergic rhinitis.    4.  Hypothyroidism    PLAN:  Reviewed compliance download with the patient  New CPAP 9 cm working well  Compliance AHI and leak look excellent  Supplies be renewed  Patient benefiting from CPAP  Advised patient to add chinstrap for dry mouth if needed  Sleep hygiene measures  Treatment of allergies with nasal steroids  Follow-up in 1 year      Rubi Merrill MD  08/22/24  09:12 EDT

## 2024-11-14 ENCOUNTER — HOSPITAL ENCOUNTER (OUTPATIENT)
Dept: MAMMOGRAPHY | Facility: HOSPITAL | Age: 74
Discharge: HOME | End: 2024-11-14
Payer: MEDICARE

## 2024-11-14 ENCOUNTER — HOSPITAL ENCOUNTER (OUTPATIENT)
Dept: BONE DENSITY | Facility: HOSPITAL | Age: 74
Discharge: HOME | End: 2024-11-14
Payer: MEDICARE

## 2024-11-14 DIAGNOSIS — Z12.31 BREAST CANCER SCREENING BY MAMMOGRAM: ICD-10-CM

## 2024-11-14 DIAGNOSIS — N95.9 POST MENOPAUSAL PROBLEMS: ICD-10-CM

## 2024-11-14 PROCEDURE — 77080 DXA BONE DENSITY AXIAL: CPT

## 2024-11-14 PROCEDURE — 77067 SCR MAMMO BI INCL CAD: CPT

## 2024-11-14 PROCEDURE — 77063 BREAST TOMOSYNTHESIS BI: CPT

## 2024-11-25 ENCOUNTER — TRANSCRIBE ORDERS (OUTPATIENT)
Dept: ADMINISTRATIVE | Facility: HOSPITAL | Age: 74
End: 2024-11-25
Payer: MEDICARE

## 2024-11-25 DIAGNOSIS — R13.10 DYSPHAGIA, UNSPECIFIED TYPE: Primary | ICD-10-CM

## 2024-12-31 ENCOUNTER — HOSPITAL ENCOUNTER (OUTPATIENT)
Dept: GENERAL RADIOLOGY | Facility: HOSPITAL | Age: 74
Discharge: HOME | End: 2024-12-31
Payer: MEDICARE

## 2024-12-31 ENCOUNTER — HOSPITAL ENCOUNTER (OUTPATIENT)
Dept: ULTRASOUND IMAGING | Facility: HOSPITAL | Age: 74
Discharge: HOME | End: 2024-12-31
Payer: MEDICARE

## 2024-12-31 DIAGNOSIS — R13.10 DYSPHAGIA, UNSPECIFIED TYPE: ICD-10-CM

## 2024-12-31 PROCEDURE — 63710000001 BARIUM SULFATE 60 % SUSPENSION: Performed by: NURSE PRACTITIONER

## 2024-12-31 PROCEDURE — A9270 NON-COVERED ITEM OR SERVICE: HCPCS | Performed by: NURSE PRACTITIONER

## 2024-12-31 PROCEDURE — 76536 US EXAM OF HEAD AND NECK: CPT

## 2024-12-31 PROCEDURE — 63710000001 BARIUM SULFATE 700 MG TABLET: Performed by: NURSE PRACTITIONER

## 2024-12-31 PROCEDURE — 74220 X-RAY XM ESOPHAGUS 1CNTRST: CPT

## 2024-12-31 PROCEDURE — 63710000001 SOD BICARB-CITRIC ACID-SIMETHICONE 2.21-1.53-0.04 G PACK: Performed by: NURSE PRACTITIONER

## 2024-12-31 PROCEDURE — 74221 X-RAY XM ESOPHAGUS 2CNTRST: CPT

## 2024-12-31 PROCEDURE — 63710000001 BARIUM SULFATE 98 % RECONSTITUTED SUSPENSION: Performed by: NURSE PRACTITIONER

## 2024-12-31 RX ADMIN — BARIUM SULFATE 135 ML: 980 POWDER, FOR SUSPENSION ORAL at 09:15

## 2024-12-31 RX ADMIN — BARIUM SULFATE 700 MG: 700 TABLET ORAL at 09:15

## 2024-12-31 RX ADMIN — ANTACID/ANTIFLATULENT 1 PACKET: 380; 550; 10; 10 GRANULE, EFFERVESCENT ORAL at 09:15

## 2024-12-31 RX ADMIN — BARIUM SULFATE 355 ML: 0.6 SUSPENSION ORAL at 09:15

## 2025-05-22 ENCOUNTER — OFFICE VISIT (OUTPATIENT)
Dept: SLEEP MEDICINE | Facility: HOSPITAL | Age: 75
End: 2025-05-22
Payer: MEDICARE

## 2025-05-22 VITALS
DIASTOLIC BLOOD PRESSURE: 66 MMHG | HEART RATE: 71 BPM | BODY MASS INDEX: 22.2 KG/M2 | WEIGHT: 146.5 LBS | HEIGHT: 68 IN | OXYGEN SATURATION: 99 % | SYSTOLIC BLOOD PRESSURE: 125 MMHG

## 2025-05-22 DIAGNOSIS — G47.33 OSA (OBSTRUCTIVE SLEEP APNEA): Primary | ICD-10-CM

## 2025-05-22 PROCEDURE — G0463 HOSPITAL OUTPT CLINIC VISIT: HCPCS

## 2025-05-22 PROCEDURE — 1159F MED LIST DOCD IN RCRD: CPT | Performed by: STUDENT IN AN ORGANIZED HEALTH CARE EDUCATION/TRAINING PROGRAM

## 2025-05-22 PROCEDURE — 1160F RVW MEDS BY RX/DR IN RCRD: CPT | Performed by: STUDENT IN AN ORGANIZED HEALTH CARE EDUCATION/TRAINING PROGRAM

## 2025-05-22 PROCEDURE — 99204 OFFICE O/P NEW MOD 45 MIN: CPT | Performed by: STUDENT IN AN ORGANIZED HEALTH CARE EDUCATION/TRAINING PROGRAM

## 2025-05-22 NOTE — PROGRESS NOTES
[facility]   [address]  [place] [address]  [phone]     Referring physician/provider: Romy Samuel APRN   PCP: Romy Samuel APRN    Type of service: Initial Sleep Medicine Consult.  Date of service: 5/22/2025        Sleep Clinic Initial Consult Visit      Patient or patient representative verbalized consent for the use of Ambient Listening during the visit with  Lai Allen DO for chart documentation. 5/22/2025  10:16 EDT    HISTORY OF PRESENT ILLNESS  Chief Complaint: Awakening during night, dry mouth  Agnieszka Nettles is a 75 y.o. female that was seen today, on 5/22/2025 at [facility].  History of Present Illness  She last saw Dr Merrill in August 2024 for RAYSHAWN.  She has history of obstructive sleep apnea diagnosed in 2017 with a lab sleep study which showed RDI of 47.8/h and there was 42.7 minutes of time with oxygen saturation less than 90%.  And then she underwent CPAP titration study on July 1, 2017 and she was titrated up to CPAP of 11 cm H2O and she developed central apneas at pressure of 9 cm H2O.  She has been on CPAP of 10 cm H2O and presents today.  She reports a significant change in her sleep pattern over the past year, with a decrease from 5 to 6 hours of uninterrupted sleep to waking up after 2 hours. She typically goes to bed at 11:00 PM and falls asleep within 15 minutes. She has been experiencing awakenings twice during the night, which is a new development. She usually wakes up after 1 to 2 hours of sleep and often needs to use the bathroom. After returning to bed, she can usually sleep for another 3 to 4 hours. Occasionally, she finds it difficult to return to sleep while wearing the mask. She typically wakes up at 5:30 AM. Upon waking, she takes her thyroid medication and may lie back down for a short period.    She consumes 2 cups of coffee daily, both in the morning. She does not take naps  "during the day. Once she is up, she remains active and on her feet most of the time.  She has no history of heart or lung problems. She has been using a nasal mask for her sleep apnea but finds it difficult to put on after waking up. She has tried a chin strap but found it uncomfortable and ineffective.   She has been using Biotene mouth rinse and has a whole-house humidifier.   She expresses concern about her dental health, specifically noting sensitivity in her teeth. She maintains regular dental check-ups twice a year.    Medical history  Obstructive sleep apnea  Hypothyroidism  GERD    SOCIAL HISTORY  She reports no alcohol use, except for maybe five or six times a year during holidays.  She used to smoke half pack per day and quit in 1981.  She is retired.      PAP download data April 19 through May 18, 2025  Device: AirSense 11 AutoSet  Mask type: Nasal  Pressure 10  % days used >4 hours: 87  Average usage days used: 5 hours 27 minutes  AHI: 0.3  Median leak: L/min 26.6  95th % leak: L/min 46.6  DME supplier: [employer], switching to [employer]          History of Sleep Study before: Yes see HPI  ESS today: 4  Occupation: Tired    Do you drive a commercial vehicle:  []   Yes     [x]   No   History of any near accidents while driving due to sleepiness in the past 5 years: []   Yes     [x]   No       Family hx(parents and siblings) (pertaining to sleep medicine)  There is no family history of RAYSHAWN    ROS:    Negative for:  Symptoms consistent with the clinical diagnosis of Restless legs syndrome  Symptoms consistent with the clinical diagnosis of Cataplexy   Sleep walking   See scanned media document for other sleep related questions      Allergies: Patient has no known allergies.       Objective   Vital Signs:   Vitals:    05/22/25 0900   BP: 125/66   Pulse: 71   SpO2: 99%   Weight: 66.5 kg (146 lb 8 oz)   Height: 172.7 cm (67.99\")     Body mass index is 22.28 kg/m².      PHYSICAL EXAM  CONSTITUTIONAL:  Non-toxic, In " no overt distress   ENT: Mallampati class 2  NECK:Neck Circumference: 12 inches  RESPIRATORY SYSTEM: Breathing appears nonlabored, no wheezes or rales  CARDIOVASULAR SYSTEM: Regular rate and rhythm  NEUROLOGICAL SYSTEM: answers questions appropriately      Result Review   The following data was reviewed by: Lai Allen DO on 05/22/2025:  [x]  Medications reviewed        ASSESSMENT/PLAN  Diagnoses and all orders for this visit:    1. RAYSHAWN (obstructive sleep apnea) (Primary)  -     PAP Therapy    Leak could be contributing to waking up early.   There is excessive leak about every night. She is using a nasal mask without a chin strap. Recommend daily usage of chin strap. Order sent to [facility] for chin strap. Discussed she may need to try more than one chin strap.  Discussed this may help with her dry mouth as well.  Follow up 2 months to recheck leak and symptoms.     Recommend discussion with PCP regarding waking with sour taste/burning sensation.     patient's BMI is Body mass index is 22.28 kg/m²..   BMI is within normal parameters. No other follow-up for BMI required.    I have also discussed with the patient the following  Untreated RAYSHAWN is associated with increased risks of stroke, heart attack, heart failure, motor vehicle accidents.   Recommended no driving or operating machinery if feeling sleepy. Discussed options of taking naps and getting rides with other people.   Generally most people need about 7 to 9 hours of sleep per night.       FOLLOW UP  Return in about 2 months (around 7/22/2025).  Patient was given instructions and counseling regarding her condition or for health maintenance advice. Please see specific information pulled into the AVS if appropriate.         Patient's questions were answered.  Thank you for allowing me to participate in the care of this patient.  Dictated Utilizing Dragon Dictation. Please note that portions of this note were completed with a voice recognition program. Part of this  note may be an electronic transcription/translation of spoken language to printed text using the Dragon Dictation System.      [facility]   Lai Allen DO  05/22/25  10:16 EDT

## 2025-07-22 ENCOUNTER — OFFICE VISIT (OUTPATIENT)
Dept: SLEEP MEDICINE | Facility: HOSPITAL | Age: 75
End: 2025-07-22
Payer: MEDICARE

## 2025-07-22 VITALS
OXYGEN SATURATION: 98 % | WEIGHT: 140.8 LBS | BODY MASS INDEX: 21.34 KG/M2 | HEART RATE: 69 BPM | SYSTOLIC BLOOD PRESSURE: 111 MMHG | HEIGHT: 68 IN | DIASTOLIC BLOOD PRESSURE: 64 MMHG

## 2025-07-22 DIAGNOSIS — G47.33 OSA (OBSTRUCTIVE SLEEP APNEA): Primary | ICD-10-CM

## 2025-07-22 PROCEDURE — 1159F MED LIST DOCD IN RCRD: CPT | Performed by: STUDENT IN AN ORGANIZED HEALTH CARE EDUCATION/TRAINING PROGRAM

## 2025-07-22 PROCEDURE — 1160F RVW MEDS BY RX/DR IN RCRD: CPT | Performed by: STUDENT IN AN ORGANIZED HEALTH CARE EDUCATION/TRAINING PROGRAM

## 2025-07-22 PROCEDURE — G0463 HOSPITAL OUTPT CLINIC VISIT: HCPCS

## 2025-07-22 PROCEDURE — 99213 OFFICE O/P EST LOW 20 MIN: CPT | Performed by: STUDENT IN AN ORGANIZED HEALTH CARE EDUCATION/TRAINING PROGRAM

## 2025-07-22 RX ORDER — SODIUM FLUORIDE 6 MG/ML
PASTE, DENTIFRICE DENTAL
COMMUNITY
Start: 2025-06-27

## 2025-07-22 NOTE — PROGRESS NOTES
[facility]    SLEEP CLINIC FOLLOW UP PROGRESS NOTE.    Agnieszka Nettles  [id number]   1950  75 y.o.  female      PCP: Romy Samuel APRN    Date of visit: 7/22/2025    No chief complaint on file.      HPI:  This is a 75 y.o. years old patient is here for the management of obstructive sleep apnea.        She last saw Dr Merrill in August 2024 for RAYSHAWN.  She has history of obstructive sleep apnea diagnosed in 2017 with a lab sleep study which showed RDI of 47.8/h and there was 42.7 minutes of time with oxygen saturation less than 90%.  And then she underwent CPAP titration study on July 1, 2017 and she was titrated up to CPAP of 11 cm H2O and she developed central apneas at pressure of 9 cm H2O.  She presents today on pressure of AutoPap 8 to 9 cm.  Last visit we discussed chinstrap and she has been using this and dry mouth and sleep has improved. She started using CPAP pillow which also helped her sleep.   Her sleep has improved since last visit.  She goes to bed at 11 PM and does not have trouble falling asleep when she wakes up 1 or 2 times during the night for uncertain reasons and she gets up at 5 AM. Sometimes she will awaken after about 5-6 hours of sleep and have trouble returning to sleep after that.     Medical history  Obstructive sleep apnea  Hypothyroidism    ESS: 4    She has two devices one she uses for travel and one she uses at home.  Device: Home device - she just switched over to this one. Travel device has the same pressure settings.   Mask type: Nasal  Pressure : 8-9  % days used >4 hours: 27  Average usage days used: 5 hours 9 minutes  AHI: 1.2  Median leak: 2.8  95th % leak 9.5  DME supplier: [employer], switching to [employer]    SOCIAL (habits pertaining to sleep medicine)  History tobacco use: None  History of alcohol use: None per week  Caffeine use: 2 cups coffee per day  OB History    No obstetric history on file.         REVIEW OF SYSTEMS:   Pertaining positive symptoms  "are:  See scanned document      PHYSICAL EXAMINATION:  CONSTITUTIONAL:  Vitals:    07/22/25 0900   BP: 111/64   Pulse: 69   SpO2: 98%   Weight: 63.9 kg (140 lb 12.8 oz)   Height: 172.7 cm (67.99\")    Body mass index is 21.41 kg/m².   RESP SYSTEM: No respiratory distress  CARDIOVASULAR: Regular rate  NEURO: Answers questions appropriately       BMI is within normal parameters. No other follow-up for BMI required.      ASSESSMENT AND PLAN:  Diagnoses and all orders for this visit:    1. RAYSHAWN (obstructive sleep apnea) (Primary)  -     PAP Therapy    Her sleep has improved with use of chin strap and CPAP pillow. Leak is resolved.  Residual AHI is 1-3.2.  Continue current settings.    Follow up 1 year or sooner if needed.    I have independently reviewed the PAP compliance data and discussed with the patient the download data and encouarged the patient to continue to use the device. The device is benefiting the patient and the device is medically necessary.  The patient is also instructed to get the supplies from the [employer] and and change them on a regular basis.  A prescription for supplies has been sent to the [employer].     Return in about 1 year (around 7/22/2026). . Patient's questions were answered.      Lai Allen, DO              "

## 2025-07-25 ENCOUNTER — TRANSCRIBE ORDERS (OUTPATIENT)
Dept: ADMINISTRATIVE | Facility: HOSPITAL | Age: 75
End: 2025-07-25
Payer: MEDICARE

## 2025-07-25 DIAGNOSIS — M81.0 SENILE OSTEOPOROSIS: Primary | ICD-10-CM

## 2025-08-14 ENCOUNTER — TELEPHONE (OUTPATIENT)
Dept: PHARMACY | Facility: HOSPITAL | Age: 75
End: 2025-08-14
Payer: MEDICARE

## (undated) DEVICE — SNAR E/S POLYP SNAREMASTER OVL/10MM 2.8X2300MM YEL

## (undated) DEVICE — LINER SURG CANSTR SXN S/RIGD 1500CC

## (undated) DEVICE — THE SINGLE USE ETRAP – POLYP TRAP IS USED FOR SUCTION RETRIEVAL OF ENDOSCOPICALLY REMOVED POLYPS.: Brand: ETRAP

## (undated) DEVICE — CONN JET HYDRA H20 AUXILIARY DISP

## (undated) DEVICE — BLCK/BITE BLOX WO/DENTL/RIM W/STRAP 54F

## (undated) DEVICE — SOL IRRG H2O PL/BG 1000ML STRL

## (undated) DEVICE — Device: Brand: DEFENDO AIR/WATER/SUCTION AND BIOPSY VALVE

## (undated) DEVICE — SOLIDIFIER LIQLOC PLS 1500CC BT

## (undated) DEVICE — Device